# Patient Record
Sex: FEMALE | Race: WHITE | NOT HISPANIC OR LATINO | URBAN - METROPOLITAN AREA
[De-identification: names, ages, dates, MRNs, and addresses within clinical notes are randomized per-mention and may not be internally consistent; named-entity substitution may affect disease eponyms.]

---

## 2017-08-29 ENCOUNTER — OUTPATIENT (OUTPATIENT)
Dept: OUTPATIENT SERVICES | Facility: HOSPITAL | Age: 77
LOS: 1 days | Discharge: HOME | End: 2017-08-29

## 2017-08-29 DIAGNOSIS — I10 ESSENTIAL (PRIMARY) HYPERTENSION: ICD-10-CM

## 2018-01-26 ENCOUNTER — OUTPATIENT (OUTPATIENT)
Dept: OUTPATIENT SERVICES | Facility: HOSPITAL | Age: 78
LOS: 1 days | Discharge: HOME | End: 2018-01-26

## 2018-01-26 DIAGNOSIS — Z00.00 ENCOUNTER FOR GENERAL ADULT MEDICAL EXAMINATION WITHOUT ABNORMAL FINDINGS: ICD-10-CM

## 2018-01-26 DIAGNOSIS — I49.1 ATRIAL PREMATURE DEPOLARIZATION: ICD-10-CM

## 2018-01-26 DIAGNOSIS — R63.4 ABNORMAL WEIGHT LOSS: ICD-10-CM

## 2018-02-16 PROBLEM — Z00.00 ENCOUNTER FOR PREVENTIVE HEALTH EXAMINATION: Status: ACTIVE | Noted: 2018-02-16

## 2018-02-23 ENCOUNTER — APPOINTMENT (OUTPATIENT)
Dept: VASCULAR SURGERY | Facility: CLINIC | Age: 78
End: 2018-02-23
Payer: MEDICARE

## 2018-02-23 VITALS
BODY MASS INDEX: 25.76 KG/M2 | HEIGHT: 62 IN | DIASTOLIC BLOOD PRESSURE: 70 MMHG | SYSTOLIC BLOOD PRESSURE: 120 MMHG | WEIGHT: 140 LBS

## 2018-02-23 DIAGNOSIS — E78.00 PURE HYPERCHOLESTEROLEMIA, UNSPECIFIED: ICD-10-CM

## 2018-02-23 DIAGNOSIS — I73.9 PERIPHERAL VASCULAR DISEASE, UNSPECIFIED: ICD-10-CM

## 2018-02-23 DIAGNOSIS — K21.9 GASTRO-ESOPHAGEAL REFLUX DISEASE W/OUT ESOPHAGITIS: ICD-10-CM

## 2018-02-23 PROCEDURE — 99203 OFFICE O/P NEW LOW 30 MIN: CPT

## 2018-02-23 PROCEDURE — 93925 LOWER EXTREMITY STUDY: CPT

## 2018-05-01 ENCOUNTER — NON-APPOINTMENT (OUTPATIENT)
Age: 78
End: 2018-05-01

## 2018-05-01 ENCOUNTER — APPOINTMENT (OUTPATIENT)
Dept: CARDIOLOGY | Facility: CLINIC | Age: 78
End: 2018-05-01

## 2018-05-01 VITALS
BODY MASS INDEX: 26.13 KG/M2 | SYSTOLIC BLOOD PRESSURE: 128 MMHG | WEIGHT: 142 LBS | DIASTOLIC BLOOD PRESSURE: 76 MMHG | HEIGHT: 62 IN | HEART RATE: 121 BPM

## 2018-05-01 DIAGNOSIS — Z78.9 OTHER SPECIFIED HEALTH STATUS: ICD-10-CM

## 2018-05-01 DIAGNOSIS — Z87.891 PERSONAL HISTORY OF NICOTINE DEPENDENCE: ICD-10-CM

## 2018-05-01 DIAGNOSIS — I27.29 OTHER SECONDARY PULMONARY HYPERTENSION: ICD-10-CM

## 2018-05-01 RX ORDER — ATORVASTATIN CALCIUM 40 MG/1
40 TABLET, FILM COATED ORAL
Qty: 90 | Refills: 3 | Status: ACTIVE | COMMUNITY

## 2018-05-01 RX ORDER — AMLODIPINE BESYLATE 5 MG/1
5 TABLET ORAL DAILY
Qty: 30 | Refills: 3 | Status: DISCONTINUED | COMMUNITY
End: 2018-05-01

## 2018-05-01 RX ORDER — OMEGA-3/DHA/EPA/FISH OIL 300-1000MG
1000 CAPSULE ORAL DAILY
Refills: 0 | Status: ACTIVE | COMMUNITY

## 2018-05-01 RX ORDER — ASPIRIN 81 MG
81 TABLET, DELAYED RELEASE (ENTERIC COATED) ORAL DAILY
Refills: 0 | Status: DISCONTINUED | COMMUNITY
End: 2018-05-01

## 2018-05-01 RX ORDER — OMEPRAZOLE 20 MG/1
20 CAPSULE, DELAYED RELEASE ORAL TWICE DAILY
Refills: 0 | Status: ACTIVE | COMMUNITY

## 2018-05-04 RX ORDER — APIXABAN 5 MG/1
5 TABLET, FILM COATED ORAL
Qty: 120 | Refills: 5 | Status: DISCONTINUED | COMMUNITY
Start: 2018-05-01 | End: 2018-05-04

## 2018-05-15 ENCOUNTER — INPATIENT (INPATIENT)
Facility: HOSPITAL | Age: 78
LOS: 2 days | Discharge: HOME | End: 2018-05-18
Attending: INTERNAL MEDICINE | Admitting: INTERNAL MEDICINE

## 2018-05-15 VITALS
TEMPERATURE: 96 F | DIASTOLIC BLOOD PRESSURE: 99 MMHG | SYSTOLIC BLOOD PRESSURE: 141 MMHG | RESPIRATION RATE: 20 BRPM | OXYGEN SATURATION: 95 % | HEART RATE: 85 BPM

## 2018-05-15 LAB
ALBUMIN SERPL ELPH-MCNC: 4.1 G/DL — SIGNIFICANT CHANGE UP (ref 3.5–5.2)
ALP SERPL-CCNC: 93 U/L — SIGNIFICANT CHANGE UP (ref 30–115)
ALT FLD-CCNC: 22 U/L — SIGNIFICANT CHANGE UP (ref 0–41)
ANION GAP SERPL CALC-SCNC: 17 MMOL/L — HIGH (ref 7–14)
APTT BLD: 26.5 SEC — LOW (ref 27–39.2)
AST SERPL-CCNC: 26 U/L — SIGNIFICANT CHANGE UP (ref 0–41)
BASOPHILS # BLD AUTO: 0.08 K/UL — SIGNIFICANT CHANGE UP (ref 0–0.2)
BASOPHILS NFR BLD AUTO: 0.8 % — SIGNIFICANT CHANGE UP (ref 0–1)
BILIRUB SERPL-MCNC: 0.8 MG/DL — SIGNIFICANT CHANGE UP (ref 0.2–1.2)
BUN SERPL-MCNC: 32 MG/DL — HIGH (ref 10–20)
CALCIUM SERPL-MCNC: 9.2 MG/DL — SIGNIFICANT CHANGE UP (ref 8.5–10.1)
CHLORIDE SERPL-SCNC: 100 MMOL/L — SIGNIFICANT CHANGE UP (ref 98–110)
CO2 SERPL-SCNC: 19 MMOL/L — SIGNIFICANT CHANGE UP (ref 17–32)
CREAT SERPL-MCNC: 1.2 MG/DL — SIGNIFICANT CHANGE UP (ref 0.7–1.5)
EOSINOPHIL # BLD AUTO: 0.31 K/UL — SIGNIFICANT CHANGE UP (ref 0–0.7)
EOSINOPHIL NFR BLD AUTO: 3.1 % — SIGNIFICANT CHANGE UP (ref 0–8)
ETHANOL SERPL-MCNC: <10 MG/DL — HIGH
GLUCOSE SERPL-MCNC: 122 MG/DL — HIGH (ref 70–99)
HCT VFR BLD CALC: 34.9 % — LOW (ref 37–47)
HGB BLD-MCNC: 11.2 G/DL — LOW (ref 12–16)
IMM GRANULOCYTES NFR BLD AUTO: 0.5 % — HIGH (ref 0.1–0.3)
INR BLD: 2.61 RATIO — HIGH (ref 0.65–1.3)
LACTATE SERPL-SCNC: 1.1 MMOL/L — SIGNIFICANT CHANGE UP (ref 0.5–2.2)
LIDOCAIN IGE QN: 80 U/L — HIGH (ref 7–60)
LYMPHOCYTES # BLD AUTO: 2.68 K/UL — SIGNIFICANT CHANGE UP (ref 1.2–3.4)
LYMPHOCYTES # BLD AUTO: 26.7 % — SIGNIFICANT CHANGE UP (ref 20.5–51.1)
MCHC RBC-ENTMCNC: 32.1 G/DL — SIGNIFICANT CHANGE UP (ref 32–37)
MCHC RBC-ENTMCNC: 33.2 PG — HIGH (ref 27–31)
MCV RBC AUTO: 103.6 FL — HIGH (ref 81–99)
MONOCYTES # BLD AUTO: 0.82 K/UL — HIGH (ref 0.1–0.6)
MONOCYTES NFR BLD AUTO: 8.2 % — SIGNIFICANT CHANGE UP (ref 1.7–9.3)
NEUTROPHILS # BLD AUTO: 6.1 K/UL — SIGNIFICANT CHANGE UP (ref 1.4–6.5)
NEUTROPHILS NFR BLD AUTO: 60.7 % — SIGNIFICANT CHANGE UP (ref 42.2–75.2)
PLATELET # BLD AUTO: 203 K/UL — SIGNIFICANT CHANGE UP (ref 130–400)
POTASSIUM SERPL-MCNC: 4.6 MMOL/L — SIGNIFICANT CHANGE UP (ref 3.5–5)
POTASSIUM SERPL-SCNC: 4.6 MMOL/L — SIGNIFICANT CHANGE UP (ref 3.5–5)
PROT SERPL-MCNC: 6.3 G/DL — SIGNIFICANT CHANGE UP (ref 6–8)
PROTHROM AB SERPL-ACNC: 28.7 SEC — HIGH (ref 9.95–12.87)
RBC # BLD: 3.37 M/UL — LOW (ref 4.2–5.4)
RBC # FLD: 13.8 % — SIGNIFICANT CHANGE UP (ref 11.5–14.5)
SODIUM SERPL-SCNC: 136 MMOL/L — SIGNIFICANT CHANGE UP (ref 135–146)
WBC # BLD: 10.04 K/UL — SIGNIFICANT CHANGE UP (ref 4.8–10.8)
WBC # FLD AUTO: 10.04 K/UL — SIGNIFICANT CHANGE UP (ref 4.8–10.8)

## 2018-05-15 RX ORDER — METOCLOPRAMIDE HCL 10 MG
10 TABLET ORAL ONCE
Qty: 0 | Refills: 0 | Status: COMPLETED | OUTPATIENT
Start: 2018-05-15 | End: 2018-05-15

## 2018-05-15 RX ORDER — SODIUM CHLORIDE 9 MG/ML
500 INJECTION INTRAMUSCULAR; INTRAVENOUS; SUBCUTANEOUS ONCE
Qty: 0 | Refills: 0 | Status: COMPLETED | OUTPATIENT
Start: 2018-05-15 | End: 2018-05-15

## 2018-05-15 RX ORDER — ONDANSETRON 8 MG/1
4 TABLET, FILM COATED ORAL ONCE
Qty: 0 | Refills: 0 | Status: COMPLETED | OUTPATIENT
Start: 2018-05-15 | End: 2018-05-15

## 2018-05-15 RX ORDER — ACETAMINOPHEN 500 MG
650 TABLET ORAL ONCE
Qty: 0 | Refills: 0 | Status: COMPLETED | OUTPATIENT
Start: 2018-05-15 | End: 2018-05-15

## 2018-05-15 RX ADMIN — ONDANSETRON 4 MILLIGRAM(S): 8 TABLET, FILM COATED ORAL at 18:07

## 2018-05-15 RX ADMIN — Medication 650 MILLIGRAM(S): at 17:48

## 2018-05-15 RX ADMIN — Medication 650 MILLIGRAM(S): at 17:30

## 2018-05-15 RX ADMIN — Medication 104 MILLIGRAM(S): at 17:25

## 2018-05-15 RX ADMIN — ONDANSETRON 4 MILLIGRAM(S): 8 TABLET, FILM COATED ORAL at 15:04

## 2018-05-15 RX ADMIN — SODIUM CHLORIDE 1000 MILLILITER(S): 9 INJECTION INTRAMUSCULAR; INTRAVENOUS; SUBCUTANEOUS at 17:16

## 2018-05-15 NOTE — ED PROVIDER NOTE - PHYSICAL EXAMINATION
CONSTITUTIONAL: Well-developed; well-nourished;   SKIN: warm, dry  HEAD: Normocephalic; 1 cm laceration to occiput with contusion. No foreign body.   EYES: no conj injection  ENT: No nasal discharge; airway clear.  NECK: Supple; non tender.  CARD: S1, S2 normal; no murmurs, gallops, or rubs. Regular rate and rhythm.   RESP: No wheezes, rales or rhonchi.  ABD: soft ntnd  EXT: Normal ROM.    NEURO: Alert, oriented, grossly unremarkable. GCS 15  PSYCH: Cooperative, appropriate.

## 2018-05-15 NOTE — ED ADULT TRIAGE NOTE - CHIEF COMPLAINT QUOTE
Pt BIBA from home, missed one step, fell down 3 steps, hit head on concrete floor, no LOC, on Aspirin and Xarelto, pt aox3, GCS 15. Pt has laceration to head. Trauma alert activated in triage.

## 2018-05-15 NOTE — H&P ADULT - ASSESSMENT
77 year female s/p fall down 1 step, +HT, -LOC, +eliquis  1. No traumatic injury  2. Staples applied to laceration - f/u in 1 week for removal  3. disposition per ED

## 2018-05-15 NOTE — ED PROVIDER NOTE - MEDICAL DECISION MAKING DETAILS
I personally evaluated the patient. I reviewed the Resident’s or Physician Assistant’s note (as assigned above), and agree with the findings and plan except as documented in my note. Patient unabel to ambulate due to weakness, patient admitted for rehab eval

## 2018-05-15 NOTE — ED PROVIDER NOTE - OBJECTIVE STATEMENT
76 yo F presents to ED after mechanical fall from standing. Hit head, no loc, + nausea and dizziness no vomiting. + r shoulder pain. Denies any other injuries. On xarelto 76 yo F presents to ED after mechanical fall from standing. Hit head, no loc, + nausea and dizziness no vomiting. + r shoulder pain. Denies any other injuries. On xarelto.

## 2018-05-15 NOTE — H&P ADULT - HISTORY OF PRESENT ILLNESS
77 year female, on eliquis for afib, s/p trip & fall down 1 step, +HT, -LOC, c/o nausea and dizziness

## 2018-05-15 NOTE — ED PROVIDER NOTE - NS ED ROS FT
Eyes:  No visual changes  ENMT:  No neck pain or stiffness.  Cardiac:  No chest pain, SOB   Respiratory:  No cough   GI:  No nausea, vomiting, diarrhea or abdominal pain.  :  No dysuria  MS:  No back pain.  Neuro:  No LOC, +nausea nad dizziness.  Skin:  No skin rash.

## 2018-05-15 NOTE — H&P ADULT - NSHPLABSRESULTS_GEN_ALL_CORE
Labs:  CAPILLARY BLOOD GLUCOSE                              11.2   10.04 )-----------( 203      ( 15 May 2018 16:17 )             34.9       05-15    136  |  100  |  32<H>  ----------------------------<  122<H>  4.6   |  19  |  1.2    Ca    9.2      15 May 2018 16:17    TPro  6.3  /  Alb  4.1  /  TBili  0.8  /  DBili  x   /  AST  26  /  ALT  22  /  AlkPhos  93  05-15      PT/INR - ( 15 May 2018 16:17 )   PT: 28.70 sec;   INR: 2.61 ratio         PTT - ( 15 May 2018 16:17 )  PTT:26.5 sec    < from: CT Head No Cont (05.15.18 @ 16:34) >    IMPRESSION:     1.  Right posterior parietal extra calvarial soft tissue swelling with   overlying skin staples and no acute underlying fractures or dislocations.    2.  Periventricular and subcortical white matter chronic small vessel   ischemic changes.    3.  No acute mass effect, midline shift or hemorrhage.      < end of copied text >    < from: CT Cervical Spine No Cont (05.15.18 @ 16:33) >    IMPRESSION:    1.  No evidence of acute cervical spine fracture or subluxation.    2.  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    3.  Advanced degenerative changes with severe spinal stenosis C4-5 and   C5-6 abd multilevel moderate-severe foraminal narrowing.    < end of copied text >

## 2018-05-15 NOTE — ED ADULT NURSE NOTE - OBJECTIVE STATEMENT
Patient BIBA s/p fall down three steps. Patient hit right side of head but denies LOC. Alert and oriented at this time. Right scalp hemorrhage noted. Cervical collar in place. Patient BIBA s/p fall down three steps. Patient hit right side of head but denies LOC. Alert and oriented at this time. Right scalp laceration noted. Cervical collar in place.

## 2018-05-15 NOTE — ED PROVIDER NOTE - PROGRESS NOTE DETAILS
I personally evaluated the patient. I reviewed the Resident’s or Physician Assistant’s note (as assigned above), and agree with the findings and plan except as documented in my note.   78 Y/O F HTN, DYSLIPIDEMIA, AFIB (ON XARELTO) S/P FALL DOWN 3 STAIRS. + HEAD TRAUMA. NO LOC. PT DENIES ANY SXS PRECEDING FALL. PT C/O NAUSEA AND DIZZINESS. NO NECK OR BACK PAIN. NO CP, SOB. NO ABD PAIN. EMS WAS CALLED BY A NEIGHBOR. VITALS NOTED. ALERT OX3 NAD [id number]. 1 CM LACERATION TO OCCIPUT .+ HEMATOMA. NO MIDLINE C SPINE TENDERNESS. LUNGS CLEAR B/L. CHEST NONTENDER, NO CREPITUS. RRR. ABD- SOFT NONTENDER. PELVIS STABLE NONTENDER. BACK NONTENDER. NO SPINE TENDERNESS. NEURO EXAM NONFOCAL. PT SIGNED OUT TO DR. KOTHARI, REASSESS AND DISPO.

## 2018-05-16 LAB
ANION GAP SERPL CALC-SCNC: 12 MMOL/L — SIGNIFICANT CHANGE UP (ref 7–14)
BASOPHILS # BLD AUTO: 0.06 K/UL — SIGNIFICANT CHANGE UP (ref 0–0.2)
BASOPHILS NFR BLD AUTO: 0.7 % — SIGNIFICANT CHANGE UP (ref 0–1)
BUN SERPL-MCNC: 23 MG/DL — HIGH (ref 10–20)
CALCIUM SERPL-MCNC: 8.7 MG/DL — SIGNIFICANT CHANGE UP (ref 8.5–10.1)
CHLORIDE SERPL-SCNC: 101 MMOL/L — SIGNIFICANT CHANGE UP (ref 98–110)
CO2 SERPL-SCNC: 25 MMOL/L — SIGNIFICANT CHANGE UP (ref 17–32)
CREAT SERPL-MCNC: 1.3 MG/DL — SIGNIFICANT CHANGE UP (ref 0.7–1.5)
EOSINOPHIL # BLD AUTO: 0.16 K/UL — SIGNIFICANT CHANGE UP (ref 0–0.7)
EOSINOPHIL NFR BLD AUTO: 1.8 % — SIGNIFICANT CHANGE UP (ref 0–8)
GLUCOSE SERPL-MCNC: 102 MG/DL — HIGH (ref 70–99)
HCT VFR BLD CALC: 28.5 % — LOW (ref 37–47)
HCT VFR BLD CALC: 29 % — LOW (ref 37–47)
HGB BLD-MCNC: 9.3 G/DL — LOW (ref 12–16)
HGB BLD-MCNC: 9.4 G/DL — LOW (ref 12–16)
IMM GRANULOCYTES NFR BLD AUTO: 0.3 % — SIGNIFICANT CHANGE UP (ref 0.1–0.3)
LYMPHOCYTES # BLD AUTO: 2.16 K/UL — SIGNIFICANT CHANGE UP (ref 1.2–3.4)
LYMPHOCYTES # BLD AUTO: 24.6 % — SIGNIFICANT CHANGE UP (ref 20.5–51.1)
MCHC RBC-ENTMCNC: 32.4 G/DL — SIGNIFICANT CHANGE UP (ref 32–37)
MCHC RBC-ENTMCNC: 32.6 G/DL — SIGNIFICANT CHANGE UP (ref 32–37)
MCHC RBC-ENTMCNC: 33.3 PG — HIGH (ref 27–31)
MCHC RBC-ENTMCNC: 33.5 PG — HIGH (ref 27–31)
MCV RBC AUTO: 102.5 FL — HIGH (ref 81–99)
MCV RBC AUTO: 102.8 FL — HIGH (ref 81–99)
MONOCYTES # BLD AUTO: 0.87 K/UL — HIGH (ref 0.1–0.6)
MONOCYTES NFR BLD AUTO: 9.9 % — HIGH (ref 1.7–9.3)
NEUTROPHILS # BLD AUTO: 5.49 K/UL — SIGNIFICANT CHANGE UP (ref 1.4–6.5)
NEUTROPHILS NFR BLD AUTO: 62.7 % — SIGNIFICANT CHANGE UP (ref 42.2–75.2)
NRBC # BLD: 0 /100 WBCS — SIGNIFICANT CHANGE UP (ref 0–0)
PLATELET # BLD AUTO: 168 K/UL — SIGNIFICANT CHANGE UP (ref 130–400)
PLATELET # BLD AUTO: 168 K/UL — SIGNIFICANT CHANGE UP (ref 130–400)
POTASSIUM SERPL-MCNC: 4.8 MMOL/L — SIGNIFICANT CHANGE UP (ref 3.5–5)
POTASSIUM SERPL-SCNC: 4.8 MMOL/L — SIGNIFICANT CHANGE UP (ref 3.5–5)
RBC # BLD: 2.78 M/UL — LOW (ref 4.2–5.4)
RBC # BLD: 2.82 M/UL — LOW (ref 4.2–5.4)
RBC # FLD: 13.6 % — SIGNIFICANT CHANGE UP (ref 11.5–14.5)
RBC # FLD: 13.7 % — SIGNIFICANT CHANGE UP (ref 11.5–14.5)
SODIUM SERPL-SCNC: 138 MMOL/L — SIGNIFICANT CHANGE UP (ref 135–146)
WBC # BLD: 8.62 K/UL — SIGNIFICANT CHANGE UP (ref 4.8–10.8)
WBC # BLD: 8.77 K/UL — SIGNIFICANT CHANGE UP (ref 4.8–10.8)
WBC # FLD AUTO: 8.62 K/UL — SIGNIFICANT CHANGE UP (ref 4.8–10.8)
WBC # FLD AUTO: 8.77 K/UL — SIGNIFICANT CHANGE UP (ref 4.8–10.8)

## 2018-05-16 RX ORDER — METOPROLOL TARTRATE 50 MG
100 TABLET ORAL DAILY
Qty: 0 | Refills: 0 | Status: DISCONTINUED | OUTPATIENT
Start: 2018-05-16 | End: 2018-05-16

## 2018-05-16 RX ORDER — LOSARTAN POTASSIUM 100 MG/1
50 TABLET, FILM COATED ORAL DAILY
Qty: 0 | Refills: 0 | Status: DISCONTINUED | OUTPATIENT
Start: 2018-05-16 | End: 2018-05-18

## 2018-05-16 RX ORDER — METOPROLOL TARTRATE 50 MG
50 TABLET ORAL DAILY
Qty: 0 | Refills: 0 | Status: DISCONTINUED | OUTPATIENT
Start: 2018-05-16 | End: 2018-05-17

## 2018-05-16 RX ORDER — AMLODIPINE BESYLATE 2.5 MG/1
5 TABLET ORAL DAILY
Qty: 0 | Refills: 0 | Status: DISCONTINUED | OUTPATIENT
Start: 2018-05-16 | End: 2018-05-17

## 2018-05-16 RX ORDER — ASPIRIN/CALCIUM CARB/MAGNESIUM 324 MG
81 TABLET ORAL DAILY
Qty: 0 | Refills: 0 | Status: DISCONTINUED | OUTPATIENT
Start: 2018-05-16 | End: 2018-05-17

## 2018-05-16 RX ORDER — ATORVASTATIN CALCIUM 80 MG/1
40 TABLET, FILM COATED ORAL AT BEDTIME
Qty: 0 | Refills: 0 | Status: DISCONTINUED | OUTPATIENT
Start: 2018-05-16 | End: 2018-05-18

## 2018-05-16 RX ORDER — METOPROLOL TARTRATE 50 MG
100 TABLET ORAL ONCE
Qty: 0 | Refills: 0 | Status: COMPLETED | OUTPATIENT
Start: 2018-05-16 | End: 2018-05-16

## 2018-05-16 RX ORDER — ACETAMINOPHEN 500 MG
650 TABLET ORAL EVERY 6 HOURS
Qty: 0 | Refills: 0 | Status: DISCONTINUED | OUTPATIENT
Start: 2018-05-16 | End: 2018-05-18

## 2018-05-16 RX ADMIN — Medication 650 MILLIGRAM(S): at 03:26

## 2018-05-16 RX ADMIN — Medication 650 MILLIGRAM(S): at 21:04

## 2018-05-16 RX ADMIN — Medication 650 MILLIGRAM(S): at 20:05

## 2018-05-16 RX ADMIN — Medication 50 MILLIGRAM(S): at 06:50

## 2018-05-16 RX ADMIN — Medication 650 MILLIGRAM(S): at 11:03

## 2018-05-16 RX ADMIN — Medication 100 MILLIGRAM(S): at 01:58

## 2018-05-16 RX ADMIN — Medication 650 MILLIGRAM(S): at 11:30

## 2018-05-16 RX ADMIN — Medication 81 MILLIGRAM(S): at 11:00

## 2018-05-16 RX ADMIN — ATORVASTATIN CALCIUM 40 MILLIGRAM(S): 80 TABLET, FILM COATED ORAL at 22:04

## 2018-05-16 NOTE — PROGRESS NOTE ADULT - SUBJECTIVE AND OBJECTIVE BOX
CC: FALL  HPI:  77 year female, on Xarelto for afib, s/p trip & fall down 1 step, +HT, -LOC, c/o nausea and dizziness (15 May 2018 18:39)    PAST MEDICAL & SURGICAL HISTORY:  High cholesterol  Afib  HTN (hypertension)  HLD    Allergies    Allergy none    Home Medications:  amLODIPine 5 mg oral tablet: 1 tab(s) orally once a day (15 May 2018 16:02)  aspirin 81 mg oral tablet, chewable: 1 tab(s) orally once a day (15 May 2018 16:02)  atorvastatin 40 mg oral tablet: 1 tab(s) orally once a day (15 May 2018 16:02)  losartan 50 mg oral tablet: 1 tab(s) orally once a day (15 May 2018 16:02)  metoprolol succinate 100 mg oral tablet, extended release: 1 tab(s) orally twice a day (15 May 2018 16:02)  Xarelto 20 mg qHS    Social history:   Marital Status:  (   )    (  x ) Single    (   )    (  )   Occupation:   Lives with: (  ) alone  (  ) children   (  ) spouse   (  ) parents  ( x ) other: sisters  Substance Use (street drugs): ( x ) never used  (  ) other:  Tobacco Usage:  ( x  ) never smoked   (   ) former smoker   (   ) current smoker  (     ) pack year    REVIEW OF SYSTEMS:    CONSTITUTIONAL: No weakness, fevers or chills.  HEENT: No visual changes, no hearing loss, no throat pain, no headache.  NECK: No pain or stiffness, no lymphadenopathy.  RESPIRATORY: No cough, wheezing, hemoptysis. No shortness of breath.  CARDIOVASCULAR: No chest pain, no palpitations, no orthopnea, no PND.  GASTROINTESTINAL: No abdominal pain. No nausea, vomiting, or hematemesis. No diarrhea or constipation, no bloating.  No melena or hematochezia.  GENITOURINARY: No dysuria, frequency or hematuria.  NEUROLOGICAL: Lumbar disc herniation  MUSCULOSKELETAL: gait abnormalities on PT.   SKIN: No itching, no rashes.    PHYSICAL EXAM:    CONSTITUTIONAL: NAD, well-developed, well-nourished  HEAD:  right scalp laceration. No bleeding.   EYES: EOMI, PERRLA, clear conjunctivae, anicteric sclerae, no nystagmus  NECK: Supple, no JVD, no carotid bruit   CHEST/LUNG: Clear to auscultation bilaterally, no wheezing or rhonchi  HEART: S1 S2 normal, irregularly irregular. No murmurs, rubs, or gallops  ABDOMEN: Bowel sounds present. Soft, non-tender, non-distended. No rebound or guarding  EXTREMITIES:  2+ peripheral pulses, no clubbing, cyanosis, or edema  PSYCH: Normal affect. Cooperative.  NEUROLOGY: AAOx3. No focal deficits. CN II-XII grossly intact  MSK: No joint swelling, redness

## 2018-05-16 NOTE — PHYSICAL THERAPY INITIAL EVALUATION ADULT - GENERAL OBSERVATIONS, REHAB EVAL
1040 - 110 Pt rec semifowler in bed in NAD. Pt is high functioning with mobility and does not require skilled b/s PT intervention at this time.

## 2018-05-16 NOTE — PROGRESS NOTE ADULT - ATTENDING COMMENTS
Patient is a 77y old  Female who presents with a chief complaint of s/p fall down 1 step (15 May 2018 18:39)  She still feels weak and dizzy on ambulation.    1. Head trauma      s/p trauma work-up. Cleared by trauma      PT/rehab evals     Monitor CBC    2.  A-fib:     Metoprolol 100 mg q12h     Resume Xarelto post Neuro SX eval

## 2018-05-16 NOTE — PROGRESS NOTE ADULT - SUBJECTIVE AND OBJECTIVE BOX
CARLA MARES  77y  Female    Patient is a 77y old  Female who presents with a chief complaint of s/p fall down 1 step (15 May 2018 18:39)      INTERVAL HPI/OVERNIGHT EVENTS: None    T(C): 36.9 (05-15-18 @ 20:08), Max: 37 (05-15-18 @ 18:32)  HR: 101 (05-15-18 @ 20:08) (85 - 132)  BP: 119/71 (05-15-18 @ 20:08) (116/64 - 141/99)  RR: 18 (05-15-18 @ 20:08) (18 - 20)  SpO2: 99% (05-15-18 @ 20:08) (95% - 99%)  Wt(kg): --Vital Signs Last 24 Hrs  T(C): 36.9 (15 May 2018 20:08), Max: 37 (15 May 2018 18:32)  T(F): 98.4 (15 May 2018 20:08), Max: 98.6 (15 May 2018 18:32)  HR: 101 (15 May 2018 20:08) (85 - 132)  BP: 119/71 (15 May 2018 20:08) (116/64 - 141/99)  BP(mean): --  RR: 18 (15 May 2018 20:08) (18 - 20)  SpO2: 99% (15 May 2018 20:08) (95% - 99%)    PHYSICAL EXAM:  GENERAL: NAD, well-groomed, well-developed  HEAD:  Atraumatic, Normocephalic  NECK: Supple, No JVD, Normal thyroid  NERVOUS SYSTEM:  Alert & Oriented X3, Good concentration; Motor Strength 5/5 B/L upper and lower extremities; DTRs 2+ intact and symmetric  CHEST/LUNG: Clear to percussion bilaterally; No rales, rhonchi, wheezing, or rubs  HEART: Regular rate and rhythm; No murmurs, rubs, or gallops  ABDOMEN: Soft, Nontender, Nondistended; Bowel sounds present  EXTREMITIES:  2+ Peripheral Pulses, No clubbing, cyanosis, or edema    Consultant(s) Notes Reviewed:  [x ] YES  [ ] NO    Discussed with Consultants/Other Providers/Residents [ x] YES     LABS                         11.2   10.04 )-----------( 203      ( 15 May 2018 16:17 )             34.9     05-15    136  |  100  |  32<H>  ----------------------------<  122<H>  4.6   |  19  |  1.2    Ca    9.2      15 May 2018 16:17    TPro  6.3  /  Alb  4.1  /  TBili  0.8  /  DBili  x   /  AST  26  /  ALT  22  /  AlkPhos  93  05-15    PT/INR - ( 15 May 2018 16:17 )   PT: 28.70 sec;   INR: 2.61 ratio         PTT - ( 15 May 2018 16:17 )  PTT:26.5 sec      LIVER FUNCTIONS - ( 15 May 2018 16:17 )  Alb: 4.1 g/dL / Pro: 6.3 g/dL / ALK PHOS: 93 U/L / ALT: 22 U/L / AST: 26 U/L / GGT: x           CAPILLARY BLOOD GLUCOSE            RADIOLOGY & ADDITIONAL TESTS:    Imaging Personally Reviewed:  [x ] YES  [ ] NO    MEDICATIONS  (STANDING):  amLODIPine   Tablet 5 milliGRAM(s) Oral daily  aspirin  chewable 81 milliGRAM(s) Oral daily  atorvastatin 40 milliGRAM(s) Oral at bedtime  losartan 50 milliGRAM(s) Oral daily  metoprolol succinate  milliGRAM(s) Oral daily    MEDICATIONS  (PRN):  acetaminophen   Tablet. 650 milliGRAM(s) Oral every 6 hours PRN Moderate Pain (4 - 6)      HEALTH ISSUES - PROBLEM Dx:

## 2018-05-16 NOTE — PROGRESS NOTE ADULT - ASSESSMENT
Patient is a 77y old  Female who presents with a chief complaint of s/p fall down 1 step (15 May 2018 18:39)  She still feels weak and dizzy on ambulation.    # Head trauma:   -s/p trauma work-up. Cleared by trauma  -admit for PT/rehab   -Monitor CBC    # A-fib:   - Metoprolol 100 mg q12h  - Resume Xarelto tomorrow if stable

## 2018-05-16 NOTE — PHYSICAL THERAPY INITIAL EVALUATION ADULT - THERAPY FREQUENCY, PT EVAL
Pt d/c from b/s PT. Pt can cont amb with NSG as needed, does not require skilled b/s PT intervention.

## 2018-05-16 NOTE — PHYSICAL THERAPY INITIAL EVALUATION ADULT - LEVEL OF INDEPENDENCE: GAIT, REHAB EVAL
supervision/Pt with brief complaints of dizziness during amb however short lasting (3-5 seconds) with no c/o "spinning".

## 2018-05-16 NOTE — PHYSICAL THERAPY INITIAL EVALUATION ADULT - LEVEL OF INDEPENDENCE: STAIR NEGOTIATION, REHAB EVAL
Did no assess due to substantial distance from ER however pt reports she has no concerns regarding stair negotiation at this time.

## 2018-05-17 ENCOUNTER — TRANSCRIPTION ENCOUNTER (OUTPATIENT)
Age: 78
End: 2018-05-17

## 2018-05-17 LAB
ANION GAP SERPL CALC-SCNC: 11 MMOL/L — SIGNIFICANT CHANGE UP (ref 7–14)
BUN SERPL-MCNC: 20 MG/DL — SIGNIFICANT CHANGE UP (ref 10–20)
CALCIUM SERPL-MCNC: 9 MG/DL — SIGNIFICANT CHANGE UP (ref 8.5–10.1)
CHLORIDE SERPL-SCNC: 102 MMOL/L — SIGNIFICANT CHANGE UP (ref 98–110)
CO2 SERPL-SCNC: 24 MMOL/L — SIGNIFICANT CHANGE UP (ref 17–32)
CREAT SERPL-MCNC: 1.1 MG/DL — SIGNIFICANT CHANGE UP (ref 0.7–1.5)
GLUCOSE SERPL-MCNC: 87 MG/DL — SIGNIFICANT CHANGE UP (ref 70–99)
HCT VFR BLD CALC: 29.9 % — LOW (ref 37–47)
HGB BLD-MCNC: 9.5 G/DL — LOW (ref 12–16)
MCHC RBC-ENTMCNC: 31.8 G/DL — LOW (ref 32–37)
MCHC RBC-ENTMCNC: 33 PG — HIGH (ref 27–31)
MCV RBC AUTO: 103.8 FL — HIGH (ref 81–99)
NRBC # BLD: 0 /100 WBCS — SIGNIFICANT CHANGE UP (ref 0–0)
PLATELET # BLD AUTO: 163 K/UL — SIGNIFICANT CHANGE UP (ref 130–400)
POTASSIUM SERPL-MCNC: 4.4 MMOL/L — SIGNIFICANT CHANGE UP (ref 3.5–5)
POTASSIUM SERPL-SCNC: 4.4 MMOL/L — SIGNIFICANT CHANGE UP (ref 3.5–5)
RBC # BLD: 2.88 M/UL — LOW (ref 4.2–5.4)
RBC # FLD: 14 % — SIGNIFICANT CHANGE UP (ref 11.5–14.5)
SODIUM SERPL-SCNC: 137 MMOL/L — SIGNIFICANT CHANGE UP (ref 135–146)
WBC # BLD: 7.97 K/UL — SIGNIFICANT CHANGE UP (ref 4.8–10.8)
WBC # FLD AUTO: 7.97 K/UL — SIGNIFICANT CHANGE UP (ref 4.8–10.8)

## 2018-05-17 RX ORDER — METOPROLOL TARTRATE 50 MG
2.5 TABLET ORAL ONCE
Qty: 0 | Refills: 0 | Status: COMPLETED | OUTPATIENT
Start: 2018-05-17 | End: 2018-05-17

## 2018-05-17 RX ORDER — ONDANSETRON 8 MG/1
4 TABLET, FILM COATED ORAL EVERY 6 HOURS
Qty: 0 | Refills: 0 | Status: DISCONTINUED | OUTPATIENT
Start: 2018-05-17 | End: 2018-05-18

## 2018-05-17 RX ORDER — AMLODIPINE BESYLATE 2.5 MG/1
1 TABLET ORAL
Qty: 0 | Refills: 0 | COMMUNITY

## 2018-05-17 RX ORDER — PANTOPRAZOLE SODIUM 20 MG/1
40 TABLET, DELAYED RELEASE ORAL
Qty: 0 | Refills: 0 | Status: DISCONTINUED | OUTPATIENT
Start: 2018-05-17 | End: 2018-05-18

## 2018-05-17 RX ORDER — METOPROLOL TARTRATE 50 MG
100 TABLET ORAL
Qty: 0 | Refills: 0 | Status: DISCONTINUED | OUTPATIENT
Start: 2018-05-17 | End: 2018-05-18

## 2018-05-17 RX ADMIN — Medication 650 MILLIGRAM(S): at 08:45

## 2018-05-17 RX ADMIN — Medication 650 MILLIGRAM(S): at 17:46

## 2018-05-17 RX ADMIN — Medication 50 MILLIGRAM(S): at 05:58

## 2018-05-17 RX ADMIN — Medication 650 MILLIGRAM(S): at 23:09

## 2018-05-17 RX ADMIN — Medication 2.5 MILLIGRAM(S): at 14:45

## 2018-05-17 RX ADMIN — Medication 81 MILLIGRAM(S): at 11:28

## 2018-05-17 RX ADMIN — ATORVASTATIN CALCIUM 40 MILLIGRAM(S): 80 TABLET, FILM COATED ORAL at 22:53

## 2018-05-17 RX ADMIN — Medication 650 MILLIGRAM(S): at 07:44

## 2018-05-17 RX ADMIN — Medication 100 MILLIGRAM(S): at 17:33

## 2018-05-17 RX ADMIN — ONDANSETRON 4 MILLIGRAM(S): 8 TABLET, FILM COATED ORAL at 12:19

## 2018-05-17 RX ADMIN — PANTOPRAZOLE SODIUM 40 MILLIGRAM(S): 20 TABLET, DELAYED RELEASE ORAL at 12:19

## 2018-05-17 RX ADMIN — Medication 650 MILLIGRAM(S): at 18:34

## 2018-05-17 NOTE — CHART NOTE - NSCHARTNOTEFT_GEN_A_CORE
Pt was stable to be d/c home today , as pt was able to ambulate and NS cleared pt for AC. Pt was found to have a heart rate of >130 , ekg done showed afib with RVR. Pt was given stat dose of iv metoprolol 2.5, with no improvement in HR . On further investigation it was found that pt was recently recommended by Dr Reeder to take 100 mg BID of metoprolol instead of 50 bid.pt was not given 100 mg bid and was getting 50 mg bid while in hospital. Cardiology fellow was made aware of situation and recommended to give home dose of metoprolol stat , upgrade to tele and continue to monitor.    Dr reeder also held aspirin and norvasc, switched pt to eliquis .  will continue to monitor and make adjustments in medications.

## 2018-05-17 NOTE — PROGRESS NOTE ADULT - ASSESSMENT
Patient is a 77y old  Female who presents with a chief complaint of s/p fall down 1 step (15 May 2018 18:39)  She still feels weak and dizzy on ambulation.    # Head trauma:   -s/p trauma work-up. Cleared by trauma , f/u in 1  wk with surgery for staple removal  -pt was seen by physical therapy and was recommended to ambulate with nursing staff as tolerated.  - Dr Motta requested for physiatrist eval again , as pt reports feeling dizzy while ambulating.    # A-fib:   - Metoprolol 100 mg q12h  - Dr Motta wants pt to be evaluated by NS before starting back on xeralto.  - NS eval pending.    # DVT :  - PPX not indicated.   - will resume xeralto once cleared from NS.

## 2018-05-17 NOTE — DISCHARGE NOTE ADULT - MEDICATION SUMMARY - MEDICATIONS TO STOP TAKING
I will STOP taking the medications listed below when I get home from the hospital:    amLODIPine 5 mg oral tablet  -- 1 tab(s) by mouth once a day I will STOP taking the medications listed below when I get home from the hospital:    metoprolol succinate 100 mg oral tablet, extended release  -- 1 tab(s) by mouth once a day    amLODIPine 5 mg oral tablet  -- 1 tab(s) by mouth once a day    aspirin 81 mg oral tablet, chewable  -- 1 tab(s) by mouth once a day

## 2018-05-17 NOTE — CONSULT NOTE ADULT - SUBJECTIVE AND OBJECTIVE BOX
HISTORY OF PRESENT ILLNESS:     77 year female, on eliquis for afib, s/p trip & fall down 1 step, +HT, -LOC, c/o nausea and dizziness      PAST MEDICAL & SURGICAL HISTORY:  High cholesterol  Afib  HTN (hypertension)    Allergies    No Known Allergies            MEDICATIONS:  Antibiotics:    Neuro:  acetaminophen   Tablet. 650 milliGRAM(s) Oral every 6 hours PRN  ondansetron Injectable 4 milliGRAM(s) IV Push every 6 hours PRN    Anticoagulation:  aspirin  chewable 81 milliGRAM(s) Oral daily    OTHER:  amLODIPine   Tablet 5 milliGRAM(s) Oral daily  atorvastatin 40 milliGRAM(s) Oral at bedtime  losartan 50 milliGRAM(s) Oral daily  metoprolol succinate ER 50 milliGRAM(s) Oral daily  pantoprazole    Tablet 40 milliGRAM(s) Oral before breakfast    IVF:      Vital Signs Last 24 Hrs  T(C): 36.5 (16 May 2018 23:32), Max: 36.5 (16 May 2018 23:32)  T(F): 97.7 (16 May 2018 23:32), Max: 97.7 (16 May 2018 23:32)  HR: 69 (17 May 2018 05:22) (69 - 115)  BP: 112/55 (17 May 2018 05:22) (95/52 - 113/64)  BP(mean): --  RR: 18 (16 May 2018 23:32) (18 - 18)  SpO2: 96% (16 May 2018 23:32) (96% - 99%)    PHYSICAL EXAM:      LABS:                        9.5    7.97  )-----------( 163      ( 17 May 2018 06:09 )             29.9     05-17    137  |  102  |  20  ----------------------------<  87  4.4   |  24  |  1.1    Ca    9.0      17 May 2018 06:09    TPro  6.3  /  Alb  4.1  /  TBili  0.8  /  DBili  x   /  AST  26  /  ALT  22  /  AlkPhos  93  05-15    PT/INR - ( 15 May 2018 16:17 )   PT: 28.70 sec;   INR: 2.61 ratio         PTT - ( 15 May 2018 16:17 )  PTT:26.5 sec      RADIOLOGY & ADDITIONAL STUDIES:    < from: CT Head No Cont (05.15.18 @ 16:34) >  1.  Right posterior parietal extra calvarial soft tissue swelling with   overlying skin staples and no acute underlying fractures or dislocations.    2.  Periventricular and subcortical white matter chronic small vessel   ischemic changes.    3.  No acute mass effect, midline shift or hemorrhage.        < end of copied text >    < from: CT Cervical Spine No Cont (05.15.18 @ 16:33) >    1.  No evidence of acute cervical spine fracture or subluxation.    2.  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    3.  Advanced degenerative changes with severe spinal stenosis C4-5 and   C5-6 abd multilevel moderate-severe foraminal narrowing.    < end of copied text >      A/p             S/P fall                 May continue her anti coagulation HISTORY OF PRESENT ILLNESS:     77 year female, on eliquis for afib, s/p trip & fall down 1 step, +HT, -LOC, c/o nausea and dizziness      PAST MEDICAL & SURGICAL HISTORY:  High cholesterol  Afib  HTN (hypertension)    Allergies    No Known Allergies            MEDICATIONS:  Antibiotics:    Neuro:  acetaminophen   Tablet. 650 milliGRAM(s) Oral every 6 hours PRN  ondansetron Injectable 4 milliGRAM(s) IV Push every 6 hours PRN    Anticoagulation:  aspirin  chewable 81 milliGRAM(s) Oral daily    OTHER:  amLODIPine   Tablet 5 milliGRAM(s) Oral daily  atorvastatin 40 milliGRAM(s) Oral at bedtime  losartan 50 milliGRAM(s) Oral daily  metoprolol succinate ER 50 milliGRAM(s) Oral daily  pantoprazole    Tablet 40 milliGRAM(s) Oral before breakfast    IVF:      Vital Signs Last 24 Hrs  T(C): 36.5 (16 May 2018 23:32), Max: 36.5 (16 May 2018 23:32)  T(F): 97.7 (16 May 2018 23:32), Max: 97.7 (16 May 2018 23:32)  HR: 69 (17 May 2018 05:22) (69 - 115)  BP: 112/55 (17 May 2018 05:22) (95/52 - 113/64)  BP(mean): --  RR: 18 (16 May 2018 23:32) (18 - 18)  SpO2: 96% (16 May 2018 23:32) (96% - 99%)    PHYSICAL EXAM:  Alert, PERRL , EOM (I)   MAEX 4 , strength 5/5 bilaterally   No neck pain     LABS:                        9.5    7.97  )-----------( 163      ( 17 May 2018 06:09 )             29.9     05-17    137  |  102  |  20  ----------------------------<  87  4.4   |  24  |  1.1    Ca    9.0      17 May 2018 06:09    TPro  6.3  /  Alb  4.1  /  TBili  0.8  /  DBili  x   /  AST  26  /  ALT  22  /  AlkPhos  93  05-15    PT/INR - ( 15 May 2018 16:17 )   PT: 28.70 sec;   INR: 2.61 ratio         PTT - ( 15 May 2018 16:17 )  PTT:26.5 sec      RADIOLOGY & ADDITIONAL STUDIES:    < from: CT Head No Cont (05.15.18 @ 16:34) >  1.  Right posterior parietal extra calvarial soft tissue swelling with   overlying skin staples and no acute underlying fractures or dislocations.    2.  Periventricular and subcortical white matter chronic small vessel   ischemic changes.    3.  No acute mass effect, midline shift or hemorrhage.        < end of copied text >    < from: CT Cervical Spine No Cont (05.15.18 @ 16:33) >    1.  No evidence of acute cervical spine fracture or subluxation.    2.  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    3.  Advanced degenerative changes with severe spinal stenosis C4-5 and   C5-6 abd multilevel moderate-severe foraminal narrowing.    < end of copied text >      A/p             S/P fall                 May continue her anti coagulation                 Follow up with Dr Youngblood for cervical stenosis in her office                             [address] [place] [address]

## 2018-05-17 NOTE — PROGRESS NOTE ADULT - SUBJECTIVE AND OBJECTIVE BOX
CARLA MARES  77y  Female    Patient is a 77y old  Female who presents with a chief complaint of s/p fall down 1 step (15 May 2018 18:39)      INTERVAL HPI/OVERNIGHT EVENTS: None    T(C): 36.5 (05-16-18 @ 23:32), Max: 36.7 (05-16-18 @ 06:22)  HR: 72 (05-16-18 @ 23:32) (69 - 115)  BP: 95/52 (05-16-18 @ 23:32) (95/52 - 138/75)  RR: 18 (05-16-18 @ 23:32) (18 - 18)  SpO2: 96% (05-16-18 @ 23:32) (96% - 100%)  Wt(kg): --Vital Signs Last 24 Hrs  T(C): 36.5 (16 May 2018 23:32), Max: 36.7 (16 May 2018 06:22)  T(F): 97.7 (16 May 2018 23:32), Max: 98.1 (16 May 2018 06:22)  HR: 72 (16 May 2018 23:32) (69 - 115)  BP: 95/52 (16 May 2018 23:32) (95/52 - 138/75)  BP(mean): --  RR: 18 (16 May 2018 23:32) (18 - 18)  SpO2: 96% (16 May 2018 23:32) (96% - 100%)    PHYSICAL EXAM:  GENERAL: NAD, well-groomed, well-developed  HEAD:  Atraumatic, Normocephalic  NECK: Supple, No JVD, Normal thyroid  NERVOUS SYSTEM:  Alert & Oriented X3, Good concentration; Motor Strength 5/5 B/L upper and lower extremities; DTRs 2+ intact and symmetric  CHEST/LUNG: Clear to percussion bilaterally; No rales, rhonchi, wheezing, or rubs  HEART: Regular rate and rhythm; No murmurs, rubs, or gallops  ABDOMEN: Soft, Nontender, Nondistended; Bowel sounds present  EXTREMITIES:  2+ Peripheral Pulses, No clubbing, cyanosis, or edema    Consultant(s) Notes Reviewed:  [x ] YES  [ ] NO    Discussed with Consultants/Other Providers/Residents [ x] YES     LABS                         9.4    8.62  )-----------( 168      ( 16 May 2018 20:43 )             29.0     05-16    138  |  101  |  23<H>  ----------------------------<  102<H>  4.8   |  25  |  1.3    Ca    8.7      16 May 2018 20:43    TPro  6.3  /  Alb  4.1  /  TBili  0.8  /  DBili  x   /  AST  26  /  ALT  22  /  AlkPhos  93  05-15    PT/INR - ( 15 May 2018 16:17 )   PT: 28.70 sec;   INR: 2.61 ratio         PTT - ( 15 May 2018 16:17 )  PTT:26.5 sec      LIVER FUNCTIONS - ( 15 May 2018 16:17 )  Alb: 4.1 g/dL / Pro: 6.3 g/dL / ALK PHOS: 93 U/L / ALT: 22 U/L / AST: 26 U/L / GGT: x           CAPILLARY BLOOD GLUCOSE            RADIOLOGY & ADDITIONAL TESTS:    Imaging Personally Reviewed:  [x ] YES  [ ] NO    MEDICATIONS  (STANDING):  amLODIPine   Tablet 5 milliGRAM(s) Oral daily  aspirin  chewable 81 milliGRAM(s) Oral daily  atorvastatin 40 milliGRAM(s) Oral at bedtime  losartan 50 milliGRAM(s) Oral daily  metoprolol succinate ER 50 milliGRAM(s) Oral daily    MEDICATIONS  (PRN):  acetaminophen   Tablet. 650 milliGRAM(s) Oral every 6 hours PRN Moderate Pain (4 - 6)      HEALTH ISSUES - PROBLEM Dx:

## 2018-05-17 NOTE — CONSULT NOTE ADULT - ASSESSMENT
IMPRESSION: Rehab of gait ab fall head trauma -now rapid a fib    PRECAUTIONS: [  x  ] Cardiac  [    ] Respiratory  [    ] Seizures [    ] Contact Isolation  [    ] Droplet Isolation  [    ] Other    Weight Bearing Status:     RECOMMENDATION:  CARDIO TO ADDRESS RAPID A FIB- CAN USE MECLIZINE FOR DIZZINESS-                                   USE WALKER AT HOME FOR SAFETY  Out of Bed to Chair     DVT/Decubiti Prophylaxis    REHAB PLAN:     [     ] Bedside P/T 3-5 times a week   [     ] Bedside O/T  2-3 times a week   [ x    ] No Rehab Therapy Indicated   [     ]  Speech Therapy   Conditioning/ROM                                 ADL                                            SEEN BY PT ALREADY- CLEARED FOR DC HOME  Bed Mobility                                            Conditioning/ROM                    NO FURTHER PT ON ACUTE CARE NEEDED  Transfers                                                  Bed Mobility  Sitting /Standing Balance                      Transfers                                        Gait Training                                            Sitting/Standing Balance  Stair Training [   ]Applicable                 Home equipment Eval                                                                     Splinting  [   ] Only      GOALS:   ADL   [    ]   Independent         Transfers  [    ] Independent            Ambulation  [     ] Independent     [     ] With device                            [    ]  CG                                               [    ]  CG                                                    [     ] CG                            [    ] Min A                                          [    ] Min A                                                [     ] Min  A          DISCHARGE PLAN:   [     ]  Good candidate for Intensive Rehabilitation/Hospital based-[facility]A [facility]                                             Will tolerate 3hrs Intensive Rehab Daily                                       [      ]  Short Term Rehab in Skilled Nursing Facility                                       [   x   ]  Home with Outpatient or VN services                                         [      ]  Possible Candidate for Intensive Hospital based Rehab

## 2018-05-17 NOTE — DISCHARGE NOTE ADULT - CARE PROVIDER_API CALL
Kiersten Youngblood (MD), Surgery  [facility] [place] [address]  Phone: [phone]  Fax: [phone]

## 2018-05-17 NOTE — PROGRESS NOTE ADULT - SUBJECTIVE AND OBJECTIVE BOX
LENGTH OF HOSPITAL STAY: 2d    CHIEF COMPLAINT:   Patient is a 77y old  Female who presents with a chief complaint of s/p fall down 1 step (15 May 2018 18:39)      HISTORY OF PRESENTING ILLNESS:    HPI:  77 year female, on eliquis for afib, s/p trip & fall down 1 step, +HT, -LOC, c/o nausea and dizziness (15 May 2018 18:39)    PAST MEDICAL & SURGICAL HISTORY  PAST MEDICAL & SURGICAL HISTORY:  High cholesterol  Afib  HTN (hypertension)    SOCIAL HISTORY:    ALLERGIES:  No Known Allergies    MEDICATIONS:  STANDING MEDICATIONS  amLODIPine   Tablet 5 milliGRAM(s) Oral daily  aspirin  chewable 81 milliGRAM(s) Oral daily  atorvastatin 40 milliGRAM(s) Oral at bedtime  losartan 50 milliGRAM(s) Oral daily  metoprolol succinate ER 50 milliGRAM(s) Oral daily    PRN MEDICATIONS  acetaminophen   Tablet. 650 milliGRAM(s) Oral every 6 hours PRN    VITALS:   T(F): 97.7  HR: 69  BP: 112/55  RR: 18  SpO2: 96%    LABS:                        9.5    7.97  )-----------( 163      ( 17 May 2018 06:09 )             29.9     05-17    137  |  102  |  20  ----------------------------<  87  4.4   |  24  |  1.1    Ca    9.0      17 May 2018 06:09    TPro  6.3  /  Alb  4.1  /  TBili  0.8  /  DBili  x   /  AST  26  /  ALT  22  /  AlkPhos  93  05-15    PT/INR - ( 15 May 2018 16:17 )   PT: 28.70 sec;   INR: 2.61 ratio         PTT - ( 15 May 2018 16:17 )  PTT:26.5 sec              RADIOLOGY:    PHYSICAL EXAM:  GEN: No acute distress  HEENT: LACERATION WITH STAPLES ON RIGHT SIDE   LUNGS: Clear to auscultation bilaterally   HEART: S1/S2 present. RRR.   ABD: Soft, non-tender, non-distended. Bowel sounds present  EXT: no edema , clubbing cyanosis  NEURO: AAOX3; no deficits

## 2018-05-17 NOTE — DISCHARGE NOTE ADULT - MEDICATION SUMMARY - MEDICATIONS TO TAKE
I will START or STAY ON the medications listed below when I get home from the hospital:    aspirin 81 mg oral tablet, chewable  -- 1 tab(s) by mouth once a day  -- Indication: For cad    losartan 50 mg oral tablet  -- 1 tab(s) by mouth once a day  -- Indication: For HTN (hypertension)    Xarelto 20 mg oral tablet  -- 1 tab(s) by mouth once a day (in the evening)  -- Indication: For A fib    atorvastatin 40 mg oral tablet  -- 1 tab(s) by mouth once a day  -- Indication: For Dld    metoprolol succinate 100 mg oral tablet, extended release  -- 1 tab(s) by mouth once a day  -- Indication: For A fib I will START or STAY ON the medications listed below when I get home from the hospital:    losartan 50 mg oral tablet  -- 1 tab(s) by mouth once a day  -- Indication: For HTN (hypertension)    Xarelto 20 mg oral tablet  -- 1 tab(s) by mouth once a day (in the evening)  -- Indication: For A fib    atorvastatin 40 mg oral tablet  -- 1 tab(s) by mouth once a day  -- Indication: For Dld    metoprolol succinate 100 mg oral tablet, extended release  -- 1 tab(s) by mouth 2 times a day  -- Indication: For Afib

## 2018-05-17 NOTE — DISCHARGE NOTE ADULT - PLAN OF CARE
trauma work up negative - prevent from recurrence.  - use a walker while ambulating.  - f/u with surgery in 1 wk for suture removal. stable -c/w xarelto at home. rate controlled now -pt marcia started on metoprolol 100 mg bid with better control in HR .  -Will c/w xarelto.

## 2018-05-17 NOTE — DISCHARGE NOTE ADULT - HOSPITAL COURSE
Patient is a 77y old  Female who presents with a chief complaint of s/p fall down 1 step , trauma work up was negative, pt had a laceration to head , staples were applied by surgery.  pt seen by NS cleared for AC ; pt can resume xarelto at home.  pt was also seen by  PT recommended use of rolling walker while ambulation.  pt is stable to be d/c home today Patient is a 77y old  Female who presents with a chief complaint of s/p fall down 1 step , trauma work up was negative, pt had a laceration to head , staples were applied by surgery.  pt seen by NS cleared for AC ; pt can resume xarelto at home.  pt was also seen by  PT recommended use of rolling walker while ambulation.  pt is stable to be d/c home on 5/17 /18 but then was found to have afib with RVR, on further inquiry it was found that pt was recommended to take metoprolol 100mgbid , but pt was getting 50 mg bid. heart rate was better controlled with metoprolol 100 BID. Pt is now stable to be d/c home on metoprolol 100 mg bid, losartan and aspirin and amlodipine were held.

## 2018-05-17 NOTE — DISCHARGE NOTE ADULT - PATIENT PORTAL LINK FT
You can access the [facility] Patient Portal, offered by [facility], by registering with the following website: [email or web address]

## 2018-05-17 NOTE — DISCHARGE NOTE ADULT - CARE PLAN
Principal Discharge DX:	Fall, initial encounter  Goal:	trauma work up negative  Assessment and plan of treatment:	- prevent from recurrence.  - use a walker while ambulating.  - f/u with surgery in 1 wk for suture removal.  Secondary Diagnosis:	Chronic atrial fibrillation  Goal:	stable  Assessment and plan of treatment:	-c/w xarelto at home. Principal Discharge DX:	Fall, initial encounter  Goal:	trauma work up negative  Assessment and plan of treatment:	- prevent from recurrence.  - use a walker while ambulating.  - f/u with surgery in 1 wk for suture removal.  Secondary Diagnosis:	Chronic atrial fibrillation  Goal:	rate controlled now  Assessment and plan of treatment:	-pt marcia started on metoprolol 100 mg bid with better control in HR .  -Will c/w xarelto.

## 2018-05-17 NOTE — ED ADULT NURSE REASSESSMENT NOTE - NS ED NURSE REASSESS COMMENT FT1
IOC 3001 made aware patient was hypotensive (90s systolic), pt bp now 112/55. as per IOC 3001, do not administer Amlodipine and Losartan at this time, just Metoprolol. pt asymptomatic and alert and oriented x 3, will continue to monitor and assess.
pt assessed and alert and oriented x 3. no complaints at this time. fall risk precautions maintained. call bell in reach, pt instructed to ring call bell for assistance. will closely monitor and assess.
Patient brought in as a trauma alert at 14:38. S/p fall down three stairs and hit right side of head. Right side head laceration. Cervical collar in place. Patient denies LOC. Alert and oriented at this time.

## 2018-05-17 NOTE — PROGRESS NOTE ADULT - ATTENDING COMMENTS
Patient is a 77y old  Female who presents with a chief complaint of s/p fall down 1 step (15 May 2018 18:39)  She still feels weak and dizzy on ambulation.    1. Head trauma      s/p trauma work-up. Cleared by trauma      PT/rehab evals     Monitor CBC    2.  A-fib:     Metoprolol 100 mg q12h     Resume Xarelto post Neuro SX eval    DC planning

## 2018-05-17 NOTE — CONSULT NOTE ADULT - SUBJECTIVE AND OBJECTIVE BOX
Patient is a 77y old  Female who presents with a chief complaint of s/p fall down 1 step (17 May 2018 13:56)    HPI:  77 year female, on eliquis for afib, s/p trip & fall down 1 step, +HT, -LOC, c/o nausea and dizziness (15 May 2018 18:39)      PAST MEDICAL & SURGICAL HISTORY:  High cholesterol  Afib  HTN (hypertension)      Hospital Course: scalp laceration sutured- trauma mayorga otherwise negative- Hx chronic LBP- no c/o at present  HR now 130-160- Rapid A Fib- cardio to see  seen by Neurosurgery and cleared for anticoagulation  TODAY'S SUBJECTIVE & REVIEW OF SYMPTOMS:     Constitutional WNL   Cardio Sl SOB   Resp WNL   GI WNL  Heme WNL  Endo WNL  Skin WNL  MSK WNL  Neuro dizziness/ nausea  Cognitive WNL  Psych WNL      MEDICATIONS  (STANDING):  amLODIPine   Tablet 5 milliGRAM(s) Oral daily  aspirin  chewable 81 milliGRAM(s) Oral daily  atorvastatin 40 milliGRAM(s) Oral at bedtime  losartan 50 milliGRAM(s) Oral daily  metoprolol succinate ER 50 milliGRAM(s) Oral daily  pantoprazole    Tablet 40 milliGRAM(s) Oral before breakfast    MEDICATIONS  (PRN):  acetaminophen   Tablet. 650 milliGRAM(s) Oral every 6 hours PRN Moderate Pain (4 - 6)  ondansetron Injectable 4 milliGRAM(s) IV Push every 6 hours PRN Nausea and/or Vomiting      FAMILY HISTORY:      Allergies    No Known Allergies    Intolerances        SOCIAL HISTORY:    [    ] Etoh  [    ] Smoking  [    ] Substance abuse     Home Environment:  [    ] Home Alone  [  x  ] Lives with Family  [    ] Home Health Aid    Dwelling:  [ x   ] Apartment  [    ] Private House  [    ] Adult Home  [    ] Skilled Nursing Facility      [    ] Short Term  [    ] Long Term  [  x  ] Stairs                           [    ] Elevator     FUNCTIONAL STATUS PTA: (Check all that apply)  Ambulation: [  x   ]Independent    [    ] Dependent     [    ] Non-Ambulatory  Assistive Device: [  x  ] SA Cane  [    ]  Q Cane  [    ] Walker  [    ]  Wheelchair  ADL : [ x   ] Independent  [    ]  Dependent   USES CANE OCC/ HAS WALKER IN HOUSE    Vital Signs Last 24 Hrs  T(C): 36.5 (16 May 2018 23:32), Max: 36.5 (16 May 2018 23:32)  T(F): 97.7 (16 May 2018 23:32), Max: 97.7 (16 May 2018 23:32)  HR: 150 (17 May 2018 16:06) (69 - 150)  BP: 130/70 (17 May 2018 16:06) (95/52 - 130/70)  BP(mean): --  RR: 18 (17 May 2018 16:06) (18 - 18)  SpO2: 98% (17 May 2018 16:06) (96% - 98%)      PHYSICAL EXAM: Alert & Oriented X3  GENERAL: NAD, well-groomed, well-developed  HEAD:  scalp lac stapled  EYES: EOMI, PERRLA, conjunctiva and sclera clear  NECK: Supple, No JVD, Normal thyroid  CHEST/LUNG: Clear to percussion bilaterally; No rales, rhonchi, wheezing, or rubs  HEART: s1s2 irreg,irreg +tacchycardic  ABDOMEN: Soft, Nontender, Nondistended; Bowel sounds present  EXTREMITIES:  2+ Peripheral Pulses, No clubbing, cyanosis, or edema    NERVOUS SYSTEM:  Cranial Nerves 2-12 intact [  x  ] Abnormal  [    ]  ROM: WFL all extremities [ x   ]  Abnormal [     ]  Motor Strength: WFL all extremities  [ x   ]  Abnormal [    ]  Sensation: intact to light touch [  x  ] Abnormal [    ]      FUNCTIONAL STATUS:  Bed Mobility: [   ]  Independent [   x ]  Supervision [    ]  Needs Assistance [  ]  N/A  Transfers: [    ]  Independent [  x  ]  Supervision [    ]  Needs Assistance [    ]  N/A    Ambulation:  [    ]  Independent [ x   ]  Supervision [    ]  Needs Assistance [    ]  N/A   ADL:  [  x  ]   Independent [    ] Requires Assistance [    ] N/A   AMBULATES TO BATHROOM WITH STAFF  SEEN BY PT ON 5/16-SUPERVISION 200' WITH AND WITHOUT WALKER  LABS:                        9.5    7.97  )-----------( 163      ( 17 May 2018 06:09 )             29.9     05-17    137  |  102  |  20  ----------------------------<  87  4.4   |  24  |  1.1    Ca    9.0      17 May 2018 06:09            RADIOLOGY & ADDITIONAL STUDIES:

## 2018-05-18 VITALS
RESPIRATION RATE: 20 BRPM | DIASTOLIC BLOOD PRESSURE: 71 MMHG | TEMPERATURE: 98 F | OXYGEN SATURATION: 97 % | HEART RATE: 80 BPM | SYSTOLIC BLOOD PRESSURE: 122 MMHG

## 2018-05-18 LAB
ALBUMIN SERPL ELPH-MCNC: 3.8 G/DL — SIGNIFICANT CHANGE UP (ref 3.5–5.2)
ALP SERPL-CCNC: 83 U/L — SIGNIFICANT CHANGE UP (ref 30–115)
ALT FLD-CCNC: 13 U/L — SIGNIFICANT CHANGE UP (ref 0–41)
ANION GAP SERPL CALC-SCNC: 11 MMOL/L — SIGNIFICANT CHANGE UP (ref 7–14)
AST SERPL-CCNC: 13 U/L — SIGNIFICANT CHANGE UP (ref 0–41)
BASOPHILS # BLD AUTO: 0.08 K/UL — SIGNIFICANT CHANGE UP (ref 0–0.2)
BASOPHILS NFR BLD AUTO: 1 % — SIGNIFICANT CHANGE UP (ref 0–1)
BILIRUB SERPL-MCNC: 0.9 MG/DL — SIGNIFICANT CHANGE UP (ref 0.2–1.2)
BUN SERPL-MCNC: 17 MG/DL — SIGNIFICANT CHANGE UP (ref 10–20)
CALCIUM SERPL-MCNC: 8.9 MG/DL — SIGNIFICANT CHANGE UP (ref 8.5–10.1)
CHLORIDE SERPL-SCNC: 102 MMOL/L — SIGNIFICANT CHANGE UP (ref 98–110)
CO2 SERPL-SCNC: 26 MMOL/L — SIGNIFICANT CHANGE UP (ref 17–32)
CREAT SERPL-MCNC: 1.1 MG/DL — SIGNIFICANT CHANGE UP (ref 0.7–1.5)
EOSINOPHIL # BLD AUTO: 0.28 K/UL — SIGNIFICANT CHANGE UP (ref 0–0.7)
EOSINOPHIL NFR BLD AUTO: 3.4 % — SIGNIFICANT CHANGE UP (ref 0–8)
GLUCOSE SERPL-MCNC: 84 MG/DL — SIGNIFICANT CHANGE UP (ref 70–99)
HCT VFR BLD CALC: 31.3 % — LOW (ref 37–47)
HGB BLD-MCNC: 10.2 G/DL — LOW (ref 12–16)
IMM GRANULOCYTES NFR BLD AUTO: 0.5 % — HIGH (ref 0.1–0.3)
LYMPHOCYTES # BLD AUTO: 1.96 K/UL — SIGNIFICANT CHANGE UP (ref 1.2–3.4)
LYMPHOCYTES # BLD AUTO: 24.1 % — SIGNIFICANT CHANGE UP (ref 20.5–51.1)
MCHC RBC-ENTMCNC: 32.6 G/DL — SIGNIFICANT CHANGE UP (ref 32–37)
MCHC RBC-ENTMCNC: 33.8 PG — HIGH (ref 27–31)
MCV RBC AUTO: 103.6 FL — HIGH (ref 81–99)
MONOCYTES # BLD AUTO: 0.88 K/UL — HIGH (ref 0.1–0.6)
MONOCYTES NFR BLD AUTO: 10.8 % — HIGH (ref 1.7–9.3)
NEUTROPHILS # BLD AUTO: 4.9 K/UL — SIGNIFICANT CHANGE UP (ref 1.4–6.5)
NEUTROPHILS NFR BLD AUTO: 60.2 % — SIGNIFICANT CHANGE UP (ref 42.2–75.2)
PLATELET # BLD AUTO: 184 K/UL — SIGNIFICANT CHANGE UP (ref 130–400)
POTASSIUM SERPL-MCNC: 5.3 MMOL/L — HIGH (ref 3.5–5)
POTASSIUM SERPL-SCNC: 5.3 MMOL/L — HIGH (ref 3.5–5)
PROT SERPL-MCNC: 5.9 G/DL — LOW (ref 6–8)
RBC # BLD: 3.02 M/UL — LOW (ref 4.2–5.4)
RBC # FLD: 13.8 % — SIGNIFICANT CHANGE UP (ref 11.5–14.5)
SODIUM SERPL-SCNC: 139 MMOL/L — SIGNIFICANT CHANGE UP (ref 135–146)
WBC # BLD: 8.14 K/UL — SIGNIFICANT CHANGE UP (ref 4.8–10.8)
WBC # FLD AUTO: 8.14 K/UL — SIGNIFICANT CHANGE UP (ref 4.8–10.8)

## 2018-05-18 RX ORDER — ASPIRIN/CALCIUM CARB/MAGNESIUM 324 MG
1 TABLET ORAL
Qty: 0 | Refills: 0 | COMMUNITY

## 2018-05-18 RX ORDER — METOPROLOL TARTRATE 50 MG
1 TABLET ORAL
Qty: 0 | Refills: 0 | COMMUNITY

## 2018-05-18 RX ORDER — RIVAROXABAN 15 MG-20MG
1 KIT ORAL
Qty: 0 | Refills: 0 | COMMUNITY

## 2018-05-18 RX ADMIN — Medication 650 MILLIGRAM(S): at 11:59

## 2018-05-18 RX ADMIN — Medication 100 MILLIGRAM(S): at 06:54

## 2018-05-18 RX ADMIN — LOSARTAN POTASSIUM 50 MILLIGRAM(S): 100 TABLET, FILM COATED ORAL at 06:54

## 2018-05-18 RX ADMIN — PANTOPRAZOLE SODIUM 40 MILLIGRAM(S): 20 TABLET, DELAYED RELEASE ORAL at 09:21

## 2018-05-18 NOTE — PROGRESS NOTE ADULT - SUBJECTIVE AND OBJECTIVE BOX
CARLA MARES  77y  Female    Patient is a 77y old  Female who presents with a chief complaint of s/p fall down 1 step (17 May 2018 13:56)      INTERVAL HPI/OVERNIGHT EVENTS: None    T(C): 36.6 (05-17-18 @ 23:35), Max: 36.7 (05-17-18 @ 19:40)  HR: 89 (05-17-18 @ 23:35) (69 - 150)  BP: 130/56 (05-17-18 @ 23:35) (112/55 - 130/74)  RR: 20 (05-17-18 @ 23:35) (18 - 20)  SpO2: 97% (05-17-18 @ 23:35) (97% - 100%)  Wt(kg): --Vital Signs Last 24 Hrs  T(C): 36.6 (17 May 2018 23:35), Max: 36.7 (17 May 2018 19:40)  T(F): 97.8 (17 May 2018 23:35), Max: 98.1 (17 May 2018 19:40)  HR: 89 (17 May 2018 23:35) (69 - 150)  BP: 130/56 (17 May 2018 23:35) (112/55 - 130/74)  BP(mean): --  RR: 20 (17 May 2018 23:35) (18 - 20)  SpO2: 97% (17 May 2018 23:35) (97% - 100%)    PHYSICAL EXAM:  GENERAL: NAD, well-groomed, well-developed  HEAD:  Atraumatic, Normocephalic  NECK: Supple, No JVD, Normal thyroid  NERVOUS SYSTEM:  Alert & Oriented X3, Good concentration; Motor Strength 5/5 B/L upper and lower extremities; DTRs 2+ intact and symmetric  CHEST/LUNG: Clear to percussion bilaterally; No rales, rhonchi, wheezing, or rubs  HEART: Regular rate and rhythm; No murmurs, rubs, or gallops  ABDOMEN: Soft, Nontender, Nondistended; Bowel sounds present  EXTREMITIES:  2+ Peripheral Pulses, No clubbing, cyanosis, or edema    Consultant(s) Notes Reviewed:  [x ] YES  [ ] NO    Discussed with Consultants/Other Providers/Residents [ x] YES     LABS                         9.5    7.97  )-----------( 163      ( 17 May 2018 06:09 )             29.9     05-17    137  |  102  |  20  ----------------------------<  87  4.4   |  24  |  1.1    Ca    9.0      17 May 2018 06:09              CAPILLARY BLOOD GLUCOSE            RADIOLOGY & ADDITIONAL TESTS:    Imaging Personally Reviewed:  [x ] YES  [ ] NO    MEDICATIONS  (STANDING):  atorvastatin 40 milliGRAM(s) Oral at bedtime  losartan 50 milliGRAM(s) Oral daily  metoprolol tartrate 100 milliGRAM(s) Oral two times a day  pantoprazole    Tablet 40 milliGRAM(s) Oral before breakfast    MEDICATIONS  (PRN):  acetaminophen   Tablet. 650 milliGRAM(s) Oral every 6 hours PRN Moderate Pain (4 - 6)  ondansetron Injectable 4 milliGRAM(s) IV Push every 6 hours PRN Nausea and/or Vomiting      HEALTH ISSUES - PROBLEM Dx:

## 2018-05-18 NOTE — PROGRESS NOTE ADULT - ATTENDING COMMENTS
Patient is a 77y old  Female who presents with a chief complaint of s/p fall down 1 step (15 May 2018 18:39)  She still feels weak and dizzy on ambulation.    1. Head trauma      s/p trauma work-up. Cleared by trauma      PT/rehab eval appreciated- dc home w/ services     Monitor CBC    2.  A-fib:     Metoprolol 100 mg q12h     Resumed Eliquis / Norvasc dc by cardio    DC planning

## 2018-05-22 DIAGNOSIS — S01.01XA LACERATION WITHOUT FOREIGN BODY OF SCALP, INITIAL ENCOUNTER: ICD-10-CM

## 2018-05-22 DIAGNOSIS — I10 ESSENTIAL (PRIMARY) HYPERTENSION: ICD-10-CM

## 2018-05-22 DIAGNOSIS — I48.2 CHRONIC ATRIAL FIBRILLATION: ICD-10-CM
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
## 2018-05-22 DIAGNOSIS — Z79.01 LONG TERM (CURRENT) USE OF ANTICOAGULANTS: ICD-10-CM
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
## 2018-05-22 DIAGNOSIS — M54.5 LOW BACK PAIN: ICD-10-CM

## 2018-05-22 DIAGNOSIS — E78.5 HYPERLIPIDEMIA, UNSPECIFIED: ICD-10-CM

## 2018-05-22 DIAGNOSIS — G89.29 OTHER CHRONIC PAIN: ICD-10-CM
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
## 2018-06-05 ENCOUNTER — APPOINTMENT (OUTPATIENT)
Dept: CARDIOLOGY | Facility: CLINIC | Age: 78
End: 2018-06-05

## 2018-06-05 VITALS
BODY MASS INDEX: 24.84 KG/M2 | HEART RATE: 98 BPM | DIASTOLIC BLOOD PRESSURE: 70 MMHG | HEIGHT: 62 IN | SYSTOLIC BLOOD PRESSURE: 102 MMHG | WEIGHT: 135 LBS

## 2018-06-05 PROBLEM — E78.00 PURE HYPERCHOLESTEROLEMIA, UNSPECIFIED: Chronic | Status: ACTIVE | Noted: 2018-05-15

## 2018-06-05 PROBLEM — I48.91 UNSPECIFIED ATRIAL FIBRILLATION: Chronic | Status: ACTIVE | Noted: 2018-05-15

## 2018-06-05 PROBLEM — I10 ESSENTIAL (PRIMARY) HYPERTENSION: Chronic | Status: ACTIVE | Noted: 2018-05-15

## 2018-10-09 ENCOUNTER — NON-APPOINTMENT (OUTPATIENT)
Age: 78
End: 2018-10-09

## 2018-10-09 ENCOUNTER — APPOINTMENT (OUTPATIENT)
Dept: CARDIOLOGY | Facility: CLINIC | Age: 78
End: 2018-10-09

## 2018-10-09 VITALS
HEIGHT: 62 IN | WEIGHT: 137 LBS | SYSTOLIC BLOOD PRESSURE: 102 MMHG | HEART RATE: 89 BPM | DIASTOLIC BLOOD PRESSURE: 70 MMHG | BODY MASS INDEX: 25.21 KG/M2

## 2019-03-05 ENCOUNTER — NON-APPOINTMENT (OUTPATIENT)
Age: 79
End: 2019-03-05

## 2019-03-05 ENCOUNTER — APPOINTMENT (OUTPATIENT)
Dept: CARDIOLOGY | Facility: CLINIC | Age: 79
End: 2019-03-05

## 2019-03-05 VITALS
HEIGHT: 62 IN | DIASTOLIC BLOOD PRESSURE: 70 MMHG | BODY MASS INDEX: 25.95 KG/M2 | SYSTOLIC BLOOD PRESSURE: 110 MMHG | WEIGHT: 141 LBS | HEART RATE: 109 BPM

## 2019-03-05 NOTE — DISCUSSION/SUMMARY
[FreeTextEntry1] : - Continue Metoprolol 100 mg q12\par - Continue Xarelto\par - Follow up in 6 months / prn symptoms

## 2019-03-05 NOTE — PHYSICAL EXAM
[General Appearance - Well Developed] : well developed [General Appearance - In No Acute Distress] : no acute distress [Respiration, Rhythm And Depth] : normal respiratory rhythm and effort [Auscultation Breath Sounds / Voice Sounds] : lungs were clear to auscultation bilaterally [Normal] : the heart rate was normal [Irregularly Irregular] : the rhythm was irregularly irregular [Abdomen Soft] : soft [Abdomen Tenderness] : non-tender [] : no hepato-splenomegaly [Abdomen Mass (___ Cm)] : no abdominal mass palpated [Nail Clubbing] : no clubbing of the fingernails [Cyanosis, Localized] : no localized cyanosis [Skin Color & Pigmentation] : normal skin color and pigmentation [No Skin Ulcers] : no skin ulcer [Oriented To Time, Place, And Person] : oriented to person, place, and time [FreeTextEntry1] : no JVD

## 2019-03-05 NOTE — HISTORY OF PRESENT ILLNESS
[FreeTextEntry1] : Rate controlled on Metoprolol 100 mg q12\par No s/s bleeding on Xarelto\par \par Left knee arthritis - possible torn meniscus - may have surgery in August\par Left arm pain after carrying heavy groceries\par \par No history of MI or CHF\par \par >4 METS\par \par Teacher at [employer]\par \par 84 yo sister - Pancreatic cancer - planned RT\par 83 yo sister - Dementia\par \par \par Echo (2/6/2018): EF 60%, Mod AI, Mod MR, Mod TR, Mod PHTN\par

## 2019-07-10 ENCOUNTER — MEDICATION RENEWAL (OUTPATIENT)
Age: 79
End: 2019-07-10

## 2019-07-10 ENCOUNTER — RX RENEWAL (OUTPATIENT)
Age: 79
End: 2019-07-10

## 2019-07-10 RX ORDER — RIVAROXABAN 20 MG/1
20 TABLET, FILM COATED ORAL
Qty: 90 | Refills: 0 | Status: DISCONTINUED | COMMUNITY
Start: 2018-05-04 | End: 2019-07-10

## 2019-08-22 ENCOUNTER — MEDICATION RENEWAL (OUTPATIENT)
Age: 79
End: 2019-08-22

## 2019-08-26 ENCOUNTER — MEDICATION RENEWAL (OUTPATIENT)
Age: 79
End: 2019-08-26

## 2019-10-30 ENCOUNTER — APPOINTMENT (OUTPATIENT)
Dept: CARDIOLOGY | Facility: CLINIC | Age: 79
End: 2019-10-30
Payer: MEDICARE

## 2019-10-30 VITALS
DIASTOLIC BLOOD PRESSURE: 80 MMHG | SYSTOLIC BLOOD PRESSURE: 126 MMHG | WEIGHT: 136 LBS | HEIGHT: 62 IN | BODY MASS INDEX: 25.03 KG/M2

## 2019-10-30 PROCEDURE — 93000 ELECTROCARDIOGRAM COMPLETE: CPT

## 2019-10-30 PROCEDURE — 99213 OFFICE O/P EST LOW 20 MIN: CPT

## 2019-10-30 NOTE — PHYSICAL EXAM
[General Appearance - Well Developed] : well developed [General Appearance - In No Acute Distress] : no acute distress [Respiration, Rhythm And Depth] : normal respiratory rhythm and effort [Auscultation Breath Sounds / Voice Sounds] : lungs were clear to auscultation bilaterally [Abdomen Soft] : soft [Abdomen Tenderness] : non-tender [Abdomen Mass (___ Cm)] : no abdominal mass palpated [Nail Clubbing] : no clubbing of the fingernails [Cyanosis, Localized] : no localized cyanosis [Skin Color & Pigmentation] : normal skin color and pigmentation [No Skin Ulcers] : no skin ulcer [Oriented To Time, Place, And Person] : oriented to person, place, and time [Normal Conjunctiva] : the conjunctiva exhibited no abnormalities [Eyelids - No Xanthelasma] : the eyelids demonstrated no xanthelasmas [] : no respiratory distress [Exaggerated Use Of Accessory Muscles For Inspiration] : no accessory muscle use [FreeTextEntry1] : irregular, no edema

## 2019-11-26 ENCOUNTER — RX RENEWAL (OUTPATIENT)
Age: 79
End: 2019-11-26

## 2020-05-05 ENCOUNTER — APPOINTMENT (OUTPATIENT)
Dept: CARDIOLOGY | Facility: CLINIC | Age: 80
End: 2020-05-05
Payer: MEDICARE

## 2020-05-05 PROCEDURE — 99442: CPT | Mod: CR

## 2020-05-05 NOTE — HISTORY OF PRESENT ILLNESS
[Verbal consent obtained from patient] : the patient, [unfilled] [Time Spent: ___ minutes] : I have spent [unfilled] minutes with the patient on the telephone [FreeTextEntry1] : 80 yo F with h/o Persistent AF on Xarelto, HTN, HLD presents for follow up.  No palpitations.  Very rare SOB possibly anxiety.  No s/s bleeding.  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  Two sisters are in [facility].\par \par Plan:\par - Continue Toprol  mg BID\par - Continue Losartan\par - Continue Xarelto\par - Follow up 6 months

## 2020-05-29 ENCOUNTER — EMERGENCY (EMERGENCY)
Facility: HOSPITAL | Age: 80
LOS: 0 days | Discharge: HOME | End: 2020-05-29
Attending: EMERGENCY MEDICINE | Admitting: EMERGENCY MEDICINE
Payer: MEDICARE

## 2020-05-29 VITALS
HEIGHT: 65 IN | OXYGEN SATURATION: 100 % | SYSTOLIC BLOOD PRESSURE: 112 MMHG | HEART RATE: 96 BPM | WEIGHT: 145.06 LBS | RESPIRATION RATE: 18 BRPM | DIASTOLIC BLOOD PRESSURE: 80 MMHG | TEMPERATURE: 98 F

## 2020-05-29 VITALS
HEART RATE: 75 BPM | OXYGEN SATURATION: 98 % | DIASTOLIC BLOOD PRESSURE: 67 MMHG | RESPIRATION RATE: 18 BRPM | SYSTOLIC BLOOD PRESSURE: 127 MMHG

## 2020-05-29 DIAGNOSIS — I48.91 UNSPECIFIED ATRIAL FIBRILLATION: ICD-10-CM

## 2020-05-29 DIAGNOSIS — D64.9 ANEMIA, UNSPECIFIED: ICD-10-CM

## 2020-05-29 DIAGNOSIS — E78.00 PURE HYPERCHOLESTEROLEMIA, UNSPECIFIED: ICD-10-CM

## 2020-05-29 DIAGNOSIS — R06.02 SHORTNESS OF BREATH: ICD-10-CM

## 2020-05-29 DIAGNOSIS — Z79.899 OTHER LONG TERM (CURRENT) DRUG THERAPY: ICD-10-CM

## 2020-05-29 DIAGNOSIS — I10 ESSENTIAL (PRIMARY) HYPERTENSION: ICD-10-CM

## 2020-05-29 LAB
ACANTHOCYTES BLD QL SMEAR: SIGNIFICANT CHANGE UP
ALBUMIN SERPL ELPH-MCNC: 4.3 G/DL — SIGNIFICANT CHANGE UP (ref 3.5–5.2)
ALP SERPL-CCNC: 127 U/L — HIGH (ref 30–115)
ALT FLD-CCNC: 13 U/L — SIGNIFICANT CHANGE UP (ref 0–41)
ANION GAP SERPL CALC-SCNC: 13 MMOL/L — SIGNIFICANT CHANGE UP (ref 7–14)
APTT BLD: 35.8 SEC — SIGNIFICANT CHANGE UP (ref 27–39.2)
AST SERPL-CCNC: 31 U/L — SIGNIFICANT CHANGE UP (ref 0–41)
BASOPHILS # BLD AUTO: 0 K/UL — SIGNIFICANT CHANGE UP (ref 0–0.2)
BASOPHILS NFR BLD AUTO: 0 % — SIGNIFICANT CHANGE UP (ref 0–1)
BILIRUB SERPL-MCNC: 1.1 MG/DL — SIGNIFICANT CHANGE UP (ref 0.2–1.2)
BLD GP AB SCN SERPL QL: SIGNIFICANT CHANGE UP
BUN SERPL-MCNC: 36 MG/DL — HIGH (ref 10–20)
BURR CELLS BLD QL SMEAR: PRESENT — SIGNIFICANT CHANGE UP
CALCIUM SERPL-MCNC: 9.1 MG/DL — SIGNIFICANT CHANGE UP (ref 8.5–10.1)
CHLORIDE SERPL-SCNC: 102 MMOL/L — SIGNIFICANT CHANGE UP (ref 98–110)
CO2 SERPL-SCNC: 19 MMOL/L — SIGNIFICANT CHANGE UP (ref 17–32)
CREAT SERPL-MCNC: 1.5 MG/DL — SIGNIFICANT CHANGE UP (ref 0.7–1.5)
EOSINOPHIL # BLD AUTO: 0.19 K/UL — SIGNIFICANT CHANGE UP (ref 0–0.7)
EOSINOPHIL NFR BLD AUTO: 3 % — SIGNIFICANT CHANGE UP (ref 0–8)
GLUCOSE SERPL-MCNC: 104 MG/DL — HIGH (ref 70–99)
HCT VFR BLD CALC: 19.7 % — LOW (ref 37–47)
HGB BLD-MCNC: 5.6 G/DL — CRITICAL LOW (ref 12–16)
INR BLD: 2.94 RATIO — HIGH (ref 0.65–1.3)
IRON SATN MFR SERPL: 27 UG/DL — LOW (ref 35–150)
IRON SATN MFR SERPL: 7 % — LOW (ref 15–50)
LYMPHOCYTES # BLD AUTO: 1.07 K/UL — LOW (ref 1.2–3.4)
LYMPHOCYTES # BLD AUTO: 17 % — LOW (ref 20.5–51.1)
MAGNESIUM SERPL-MCNC: 2 MG/DL — SIGNIFICANT CHANGE UP (ref 1.8–2.4)
MANUAL SMEAR VERIFICATION: SIGNIFICANT CHANGE UP
MCHC RBC-ENTMCNC: 22.3 PG — LOW (ref 27–31)
MCHC RBC-ENTMCNC: 28.4 G/DL — LOW (ref 32–37)
MCV RBC AUTO: 78.5 FL — LOW (ref 81–99)
MONOCYTES # BLD AUTO: 0.31 K/UL — SIGNIFICANT CHANGE UP (ref 0.1–0.6)
MONOCYTES NFR BLD AUTO: 5 % — SIGNIFICANT CHANGE UP (ref 1.7–9.3)
NEUTROPHILS # BLD AUTO: 4.7 K/UL — SIGNIFICANT CHANGE UP (ref 1.4–6.5)
NEUTROPHILS NFR BLD AUTO: 75 % — SIGNIFICANT CHANGE UP (ref 42.2–75.2)
NRBC # BLD: 0 /100 — SIGNIFICANT CHANGE UP (ref 0–0)
NRBC # BLD: SIGNIFICANT CHANGE UP /100 WBCS (ref 0–0)
NT-PROBNP SERPL-SCNC: 6239 PG/ML — HIGH (ref 0–300)
PLAT MORPH BLD: NORMAL — SIGNIFICANT CHANGE UP
PLATELET # BLD AUTO: 384 K/UL — SIGNIFICANT CHANGE UP (ref 130–400)
POTASSIUM SERPL-MCNC: 5.1 MMOL/L — HIGH (ref 3.5–5)
POTASSIUM SERPL-SCNC: 5.1 MMOL/L — HIGH (ref 3.5–5)
PROT SERPL-MCNC: 6.4 G/DL — SIGNIFICANT CHANGE UP (ref 6–8)
PROTHROM AB SERPL-ACNC: 33.8 SEC — HIGH (ref 9.95–12.87)
RBC # BLD: 2.51 M/UL — LOW (ref 4.2–5.4)
RBC # FLD: 19.7 % — HIGH (ref 11.5–14.5)
RBC BLD AUTO: ABNORMAL
ROULEAUX BLD QL SMEAR: PRESENT — SIGNIFICANT CHANGE UP
SODIUM SERPL-SCNC: 134 MMOL/L — LOW (ref 135–146)
TIBC SERPL-MCNC: 370 UG/DL — SIGNIFICANT CHANGE UP (ref 220–430)
TROPONIN T SERPL-MCNC: <0.01 NG/ML — SIGNIFICANT CHANGE UP
UIBC SERPL-MCNC: 343 UG/DL — SIGNIFICANT CHANGE UP (ref 110–370)
WBC # BLD: 6.27 K/UL — SIGNIFICANT CHANGE UP (ref 4.8–10.8)
WBC # FLD AUTO: 6.27 K/UL — SIGNIFICANT CHANGE UP (ref 4.8–10.8)

## 2020-05-29 PROCEDURE — 71045 X-RAY EXAM CHEST 1 VIEW: CPT | Mod: 26

## 2020-05-29 PROCEDURE — 99284 EMERGENCY DEPT VISIT MOD MDM: CPT

## 2020-05-29 RX ORDER — METOPROLOL TARTRATE 50 MG
1 TABLET ORAL
Qty: 0 | Refills: 0 | DISCHARGE

## 2020-05-29 RX ORDER — FUROSEMIDE 40 MG
20 TABLET ORAL ONCE
Refills: 0 | Status: COMPLETED | OUTPATIENT
Start: 2020-05-29 | End: 2020-05-29

## 2020-05-29 NOTE — ED PROVIDER NOTE - PHYSICAL EXAMINATION
Physical Exam    Vital Signs: I have reviewed the initial vital signs.  Constitutional: well-nourished, appears stated age, no acute distress  Eyes: Conjunctiva pink, Sclera clear,  Cardiovascular: S1 and S2, regular rate, regular rhythm, well-perfused extremities, radial pulses equal and 2+, pedal pulses 2+ and equal   Respiratory: unlabored respiratory effort, clear to auscultation bilaterally no wheezing, rales and rhonchi  Gastrointestinal: soft, non-tender abdomen, no pulsatile mass, normal bowl sounds  Rectal" chaperoned with rn myra, brown stool per rectum.   Musculoskeletal: supple neck, no lower extremity edema, no midline tenderness  Integumentary: warm, dry, no rash  Neurologic: awake, alert, nvi

## 2020-05-29 NOTE — ED PROVIDER NOTE - CARE PROVIDER_API CALL
Nidhi Stafford  Internal Medicine  [address] [place] [address]  Phone: [phone]  Fax: [phone]  Follow Up Time: 1-3 Days

## 2020-05-29 NOTE — ED PROVIDER NOTE - OBJECTIVE STATEMENT
80 yo female, pmh of afib on ac, htn, hld, presents to ed for abnl lab. pt had outpt hgb of 5.6, pt co sob, mild, no specific pain or radiation. denies fever, chills, cp, le swelling, back pain, nvd, dark/ bloody stools.

## 2020-05-29 NOTE — ED PROVIDER NOTE - PATIENT PORTAL LINK FT
You can access the FollowMyHealth Patient Portal offered by [facility] by registering at the following website: [email or web address]. By joining [facility]’s FollowMyHealth portal, you will also be able to view your health information using other applications (apps) compatible with our system.

## 2020-05-29 NOTE — ED PROVIDER NOTE - PROGRESS NOTE DETAILS
JK: Official report of XR noted, pt without clinical SX or signs of pneumonia. ATTENDING NOTE:   78 y/o F c/o dyspnea on exertion x 1-2 weeks, which is worsening. Pt saw PMD 2 days ago, had blood work done at that time which showed a HGB of 5.6. Anemia was microcytic so pt was sent to ED for evaluation. Vital signs noted. NAD; abdomen NT; stool brown. Diagnostic testing reviewed, will transfuse 3 units of blood while in ED. Malcolm: tolerating transfusion well.  1st unit almost done.  in view of BNP, will give IV Lasix. Malcolm: chest clear. Pt without issue during all 3 transfusions, resting comfortably, VSS, will dc with fu with pmd negrete. pt comfortable with plan. sxs that should prompt return explained to pt, verbalizes understanding. tc f/u with PMD: pt with improved sx as of yesterday.

## 2020-05-29 NOTE — ED ADULT NURSE REASSESSMENT NOTE - NS ED NURSE REASSESS COMMENT FT1
blood transfusion underway, no s/s transfusion reaction, pt calm in bed, sitting up talking on cell phone requesting food which is supplied

## 2020-05-29 NOTE — ED ADULT NURSE NOTE - CHPI ED NUR SYMPTOMS NEG
no dizziness/no pain/no decreased eating/drinking/no fever/no tingling/no nausea/no vomiting/no chills

## 2020-05-29 NOTE — ED ADULT NURSE REASSESSMENT NOTE - NS ED NURSE REASSESS COMMENT FT1
SMITH and legs feel heavy x about 1 week, low H&H on outpatient labs, on xarelto for afib, denies any bleeding episodes, PA performs rectal exam and stool soft and brown, pt aaox4, pale, calm

## 2020-05-30 LAB — FERRITIN SERPL-MCNC: 13 NG/ML — LOW (ref 15–150)

## 2020-06-05 PROBLEM — K27.9 PEPTIC ULCER, SITE UNSPECIFIED, UNSPECIFIED AS ACUTE OR CHRONIC, WITHOUT HEMORRHAGE OR PERFORATION: Chronic | Status: ACTIVE | Noted: 2020-05-29

## 2020-06-12 ENCOUNTER — APPOINTMENT (OUTPATIENT)
Dept: CARDIOLOGY | Facility: CLINIC | Age: 80
End: 2020-06-12
Payer: MEDICARE

## 2020-06-12 DIAGNOSIS — I10 ESSENTIAL (PRIMARY) HYPERTENSION: ICD-10-CM

## 2020-06-12 PROCEDURE — 93306 TTE W/DOPPLER COMPLETE: CPT

## 2020-07-02 ENCOUNTER — APPOINTMENT (OUTPATIENT)
Dept: BREAST CENTER | Facility: CLINIC | Age: 80
End: 2020-07-02
Payer: MEDICARE

## 2020-07-02 VITALS
HEIGHT: 62 IN | BODY MASS INDEX: 25.03 KG/M2 | SYSTOLIC BLOOD PRESSURE: 124 MMHG | TEMPERATURE: 98.1 F | DIASTOLIC BLOOD PRESSURE: 80 MMHG | WEIGHT: 136 LBS

## 2020-07-02 DIAGNOSIS — D64.9 ANEMIA, UNSPECIFIED: ICD-10-CM

## 2020-07-02 DIAGNOSIS — Z80.3 FAMILY HISTORY OF MALIGNANT NEOPLASM OF BREAST: ICD-10-CM

## 2020-07-02 DIAGNOSIS — Z80.0 FAMILY HISTORY OF MALIGNANT NEOPLASM OF DIGESTIVE ORGANS: ICD-10-CM

## 2020-07-02 PROCEDURE — 93702 BIS XTRACELL FLUID ANALYSIS: CPT

## 2020-07-02 PROCEDURE — 99205 OFFICE O/P NEW HI 60 MIN: CPT

## 2020-07-09 NOTE — DATA REVIEWED
[FreeTextEntry1] : EXAM: MAMMO BILATERAL DIAGNOSTIC (baseline) \par             US BREAST\par DATE:   6/9/2020\par OVERALL IMPRESSION:\par Irregular lobular and spiculated ill-defined mass in the anterior 12:00 left breast, approximately measuring 3 x 3.5 cm with associated pleomorphic microcalcifications. The finding is suspicious and further assessment with ultrasound-guided core biopsy is recommended. The findings and recommendations were discussed with the patient by the dictating radiologist\par Faint grouped calcifications in the 11:00 left breast, these calcifications can be removed at the time of surgery via wire localization\par There is no mammographic or sonographic evidence of malignancy in the right breast.\par Biopsy of the left breast(s) is recommended. A letter will be sent to the patient to return for a biopsy.\par The findings and recommendations were discussed with the patient.\par BI-RADS 4: SUSPICIOUS\par MAMMO TOMOSYNTHESIS DIAGNOSTIC BILATERAL, US BREAST\par Clinical Breast Exam: Patient does report clinical breast exam in the last year.\par Clinical Indication: Patient has a palpable lump in the left breast. Family history of sister with breast cancer.\par Baseline mammogram\par MAMMOGRAM:\par Tomosynthesis and 2D imaging of the breast(s) were performed. Current study was also evaluated with a computer aided detection (CAD) system.\par Breast Density: Almost entirely fatty.\par No significant masses, calcifications, or other findings are seen in the right breast.\par There is an abnormal high density mass with spiculated margins and pleomorphic calcifications in the 12-1 o'clock anterior left breast. The mass is difficult to measure because of its irregular margins but approximates 3 x 3.5 cm in size.\par Additionally there are is a faint cluster of indeterminate calcifications in the 11:00 left breast, middle depth. These faint calcifications can be removed at the time of surgical excision.\par US BREAST COMPLETE BILATERAL\par Ultrasound evaluation was performed including examination of all four quadrants of the breast(s) and the retroareolar regions.\par Color flow, Gray scale and real-time ultrasound of both breasts was performed.\par No suspicious abnormalities were seen sonographically in the right breast. The right axilla is unremarkable.\par Ultrasound of the left breast reveals a lobular, spiculated and irregular mass in the 12:00 location at 3 cm from the nipple. The mass is difficult to measure exactly because of lobulation but likely spans 3 x 3.5 cm. On ultrasound there is a superficial lobular component that measures 0.7 cm. However, this is confluent with the main tumor mass. There is associated nipple inversion. Pleomorphic microcalcifications are associated with the mass. Further evaluation with ultrasound-guided core biopsy is recommended.\par The left axillary region is unremarkable\par Electronic Signature: I personally reviewed the images and agree with this report. Final Report: Dictated by and Signed by Attending Ciarra Ponce MD 6/9/2020 11:06 AM\par \par EXAM: US BREAST CORE BIOPSY \par  DATE:   6/17/2020\par IMPRESSION:\par Ultrasound guided needle biopsy of the left breast. Pathology pending.\par A final report will be issued when Pathology results are available. _\par US BREAST CORE BIOPSY LEFT, MAMMO DIGITAL POST PROCEDURE LEFT\par HISTORY: Ultrasound of the left breast dated 6/9/2020 revealed a suspicious ill-defined 3.5 cm mass at 12-1 o'clock anteriorly which was recommended for biopsy.\par Benefits, risks and alternatives to the procedure were explained to the patient and informed written consent was obtained.\par The patient denied taking aspirin or anticoagulants and stated no allergies to topical antiseptic solutions or local anesthetic.\par Utilizing universal protocol, site and patient verification was performed prior to starting the procedure.\par PROCEDURE: After sterile skin preparation, local anesthesia was achieved with 1% lidocaine. Under ultrasound guidance, a 12G vacuum assisted needle biopsy device was used to obtain multiple core specimens.\par After the procedure, a hourglass marking clip was deployed in the area of sampling.\par The patient tolerated the procedure well without immediate complications.\par POST PROCEDURE MAMMOGRAM FOR MARKER PLACEMENT\par A post-procedure mammogram was performed and demonstrates a clip in the appropriate location.\par Faint grouped calcifications in the 11:00 left breast are again demonstrated.\par Electronic Signature: I personally reviewed the images and agree with this report. Final Report: Dictated by and Signed by Attending Ciarra Ponce MD 6/17/2020 1:04 PM\par \par ADDENDUM\par Pathology Report Received From [facility]:\par \par The pathology report of the left breast ultrasound-guided core biopsy dated 6/17/2020 indicated that the target at 12:00, 3 cm from the nipple is MALIGNANT\par \par SPECIMEN NUMBER: [id number]\par COLLECTION DATE: 06/17/2020\par \par SPECIMENS:\par \par 1. BREAST, LEFT, 12 O'CLOCK, 3 CM, FN: CORE BIOPSY\par \par DIAGNOSIS:\par \par 1. BREAST, LEFT 12 O'CLOCK "3 CM FN", CORE BIOPSY:\par - INVASIVE DUCTAL CARCINOMA, WITH FOCAL PAPILLARY FEATURES, POORLY\par DIFFERENTIATED, ASSOCIATED WITH MICROCALCIFICATIONS\par - Histologic Grade (John Histologic Score)\par Glandular (Acinar)/Tubular Differentiation: Score 3 (<10% of tumor\par area forming glandular/tubular structures)\par Nuclear Pleomorphism: Score 3 (cells larger than normal with open\par vesicular nuclei, visible nucleoli, and moderate variability in both\par size and shape\par Mitotic Rate: Score 2\par Overall Grade: Grade 3 (scores of 6 or 7)\par Note: Biomarkers are requested and the results will be reported in\par an addendum.\par Jose Luis Hanna M.D.\par Pathologist\par \par The pathology results are concordant with the imaging findings.\par \par The patient has been notified of these results by telephone by Dr. Ponce on 6/18/2020 at 11:46 AM. She has been advised to contact your office and to arrange for SURGICAL AND ONCOLOGIC CONSULTATION AND MANAGEMENT.\par \par Surgical consultation of the left breast(s) is recommended. _\par \par Electronic Signature: I personally reviewed the images and agree with this report. Final Report: Dictated by and Signed by Attending Ciarra Ponce MD 6/18/2020 11:46 AM\par *******END OF ADDENDUM******\par

## 2020-07-09 NOTE — ASSESSMENT
[FreeTextEntry1] : 80 year old female who presents today with her friend for consultation.  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  This is a self palpated finding; she has not had a screening mammogram in more than ten years. She has no prior complaints related to the breasts and no prior history of breast surgery or breast biopsy.  Her family history is significant for her sister with breast cancer at 61, another sister with pancreatic cancer, and her father with stomach cancer.  There is no genetic testing in the patient or her family.  \par \par On physical exam, in the left breast there is a 3 cm palpable mass located in the upper outer quadrant with associated hematoma and nipple inversion.  There are no discrete masses in the right breast or bilateral axillae.  There is no right nipple inversion or discharge bilaterally.  There are no skin changes bilaterally.  We had a lengthy discussion regarding her diagnosis and treatment options.  Her pathology results were reviewed.  She is not a candidate for breast conserving therapy.  She agrees to left breast simple mastectomy and sentinel lymph node mapping and biopsy, with possible axillary node dissection.   [place] lymph node biopsy is done by injecting the periareolar breast tissue with a radioactive tracer one day prior to her surgery and removing the first few lymph nodes that drain the breast.  She declines the option for immediate breast reconstruction.  \par \par The benefits and risks of the mastectomy procedure were explained to the patient, including but not limited to bleeding, infection, seroma and/or hematoma formation, pain, and possible re-operation if the surgical excision demonstrates positive margins.  Lymph node biopsies carry the risk of bleeding, infection, wound healing problems, fluid collections at the operative site, pain, numbness or sensitivity of the skin in that area, swelling of the arm, injury to nerves under the arm that affect muscle of the chest and back, stiffness of the underarm and shoulder. The more lymph nodes that need to be removed, the higher the chance of chronic arm swelling/lymphedema, and the wider the area of numbness and sensitivity may be.\par \par Informed consent was obtained today.  She is aware that she will meet with the pre-surgical department here at the hospital for pre-surgery testing.  She is also aware that she will likely need to meet with her primary care physician, and/or other medical specialists to obtain clearance for surgery, and to contact them appropriately.  \par \par I spent a total of 60 minutes of face to face time with this patient, greater than 50% of which was spent in counseling and/or coordination of care.  All of her questions were appropriately answered. \par

## 2020-07-09 NOTE — HISTORY OF PRESENT ILLNESS
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 \par Self palpated finding; her last screening mammogram was more than ten years ago.  Patient complains of left breast mass with left nipple inversion. \par Her family history is significant for her sister with breast cancer at 61, her father with stomach cancer at 49, her other sister with pancreatic cancer at 75.  No genetic testing in the patient or her family. \par \par SHERI L-Dex Score: -0.6\par Date: 7/2/20

## 2020-07-09 NOTE — PAST MEDICAL HISTORY
[Postmenopausal] : The patient is postmenopausal [Menarche Age ____] : age at menarche was [unfilled] [Menopause Age____] : age at menopause was [unfilled] [Total Preg ___] : G[unfilled] [History of Hormone Replacement Treatment] : has no history of hormone replacement treatment [FreeTextEntry5] : D&C [FreeTextEntry6] : none [FreeTextEntry7] : none [FreeTextEntry8] : none

## 2020-07-09 NOTE — REASON FOR VISIT
[Consultation] : a consultation visit [Friend] : friend [FreeTextEntry1] : Left breast cancer, self palpated; last screening mammogram was more than ten years ago.

## 2020-07-09 NOTE — REVIEW OF SYSTEMS
[Breast Lump] : breast lump [Nipple Inverted] : inversion of the nipple [Fever] : no fever [Chills] : no chills [Breast Swelling] : no breast swelling [Breast Reddening] : no reddening of the breast [Breast Warmth] : no breast warmth [Breast Itching] : no breast itching [Dimpling Of Skin] : no dimpling of breast skin [Enlargement] : no breast enlargement ['Orange Peel' Appearance] : no 'orange peel' appearance of breast skin [Decreasing In Size] : breast size not decreasing [FreeTextEntry2] : denies recent travel history.   [Nipple Discharge] : no nipple discharge [de-identified] : left breast palpable lump [FreeTextEntry4] : left nipple inversion.

## 2020-07-09 NOTE — PHYSICAL EXAM
[Normocephalic] : normocephalic [Supple] : supple [Atraumatic] : atraumatic [No Supraclavicular Adenopathy] : no supraclavicular adenopathy [No Thyromegaly] : no thyromegaly [No Cervical Adenopathy] : no cervical adenopathy [Symmetrical] : symmetrical [Examined in the supine and seated position] : examined in the supine and seated position [No dominant masses] : no dominant masses in right breast  [No Nipple Retraction] : no right nipple retraction [Breast Nipple Inversion Left] : nipple inverted [No Nipple Discharge] : no left nipple discharge [Breast Mass Right Breast ___cm] : no masses [___cm] : ~M [unfilled] ~Ucm upper outer quadrant mass [No Axillary Lymphadenopathy] : no left axillary lymphadenopathy [Full ROM] : full range of motion [No Swelling] : no swelling [No Edema] : no edema [No Ulceration] : no ulceration [No Rashes] : no rashes [Breast Nipple Fissures] : nipples not fissured [Breast Nipple Retraction] : nipples not retracted [Breast Nipple Flattening] : nipples not flattened [Breast Abnormal Lactation (Galactorrhea)] : no galactorrhea [Breast Abnormal Secretion Serous Fluid] : no serous discharge [Breast Abnormal Secretion Bloody Fluid] : no bloody discharge [Breast Abnormal Secretion Opalescent Fluid] : no milky discharge [de-identified] : Left breast 3 cm mass with hematoma with associated nipple inversion.

## 2020-07-09 NOTE — CONSULT LETTER
[Consult Letter:] : I had the pleasure of evaluating your patient, [unfilled]. [Dear  ___] : Dear  [unfilled], [Please see my note below.] : Please see my note below. [Consult Closing:] : Thank you very much for allowing me to participate in the care of this patient.  If you have any questions, please do not hesitate to contact me. [FreeTextEntry2] : Nidhi Stafford M.D.\par [address]\[place] [address]  [FreeTextEntry3] : Desiree Banerjee M.D., F.A.C.S.  [Sincerely,] : Sincerely,

## 2020-07-21 ENCOUNTER — LABORATORY RESULT (OUTPATIENT)
Age: 80
End: 2020-07-21

## 2020-07-29 ENCOUNTER — OUTPATIENT (OUTPATIENT)
Dept: OUTPATIENT SERVICES | Facility: HOSPITAL | Age: 80
LOS: 1 days | Discharge: HOME | End: 2020-07-29
Payer: MEDICARE

## 2020-07-29 VITALS
OXYGEN SATURATION: 98 % | SYSTOLIC BLOOD PRESSURE: 146 MMHG | TEMPERATURE: 98 F | DIASTOLIC BLOOD PRESSURE: 86 MMHG | WEIGHT: 128.09 LBS | HEIGHT: 67 IN | HEART RATE: 78 BPM | RESPIRATION RATE: 16 BRPM

## 2020-07-29 DIAGNOSIS — C50.412 MALIGNANT NEOPLASM OF UPPER-OUTER QUADRANT OF LEFT FEMALE BREAST: ICD-10-CM

## 2020-07-29 DIAGNOSIS — Z01.818 ENCOUNTER FOR OTHER PREPROCEDURAL EXAMINATION: ICD-10-CM

## 2020-07-29 DIAGNOSIS — Z98.890 OTHER SPECIFIED POSTPROCEDURAL STATES: Chronic | ICD-10-CM

## 2020-07-29 LAB
ALBUMIN SERPL ELPH-MCNC: 4.9 G/DL — SIGNIFICANT CHANGE UP (ref 3.5–5.2)
ALP SERPL-CCNC: 169 U/L — HIGH (ref 30–115)
ALT FLD-CCNC: 10 U/L — SIGNIFICANT CHANGE UP (ref 0–41)
ANION GAP SERPL CALC-SCNC: 13 MMOL/L — SIGNIFICANT CHANGE UP (ref 7–14)
APTT BLD: 45.7 SEC — HIGH (ref 27–39.2)
AST SERPL-CCNC: 23 U/L — SIGNIFICANT CHANGE UP (ref 0–41)
BASOPHILS # BLD AUTO: 0.09 K/UL — SIGNIFICANT CHANGE UP (ref 0–0.2)
BASOPHILS NFR BLD AUTO: 1.1 % — HIGH (ref 0–1)
BILIRUB SERPL-MCNC: 1.2 MG/DL — SIGNIFICANT CHANGE UP (ref 0.2–1.2)
BUN SERPL-MCNC: 27 MG/DL — HIGH (ref 10–20)
CALCIUM SERPL-MCNC: 10.2 MG/DL — HIGH (ref 8.5–10.1)
CHLORIDE SERPL-SCNC: 100 MMOL/L — SIGNIFICANT CHANGE UP (ref 98–110)
CO2 SERPL-SCNC: 23 MMOL/L — SIGNIFICANT CHANGE UP (ref 17–32)
CREAT SERPL-MCNC: 1.4 MG/DL — SIGNIFICANT CHANGE UP (ref 0.7–1.5)
EOSINOPHIL # BLD AUTO: 0.25 K/UL — SIGNIFICANT CHANGE UP (ref 0–0.7)
EOSINOPHIL NFR BLD AUTO: 3.1 % — SIGNIFICANT CHANGE UP (ref 0–8)
GLUCOSE SERPL-MCNC: 91 MG/DL — SIGNIFICANT CHANGE UP (ref 70–99)
HCT VFR BLD CALC: 36.5 % — LOW (ref 37–47)
HGB BLD-MCNC: 11 G/DL — LOW (ref 12–16)
IMM GRANULOCYTES NFR BLD AUTO: 0.4 % — HIGH (ref 0.1–0.3)
INR BLD: 3.55 RATIO — HIGH (ref 0.65–1.3)
LYMPHOCYTES # BLD AUTO: 1.35 K/UL — SIGNIFICANT CHANGE UP (ref 1.2–3.4)
LYMPHOCYTES # BLD AUTO: 16.8 % — LOW (ref 20.5–51.1)
MCHC RBC-ENTMCNC: 27.2 PG — SIGNIFICANT CHANGE UP (ref 27–31)
MCHC RBC-ENTMCNC: 30.1 G/DL — LOW (ref 32–37)
MCV RBC AUTO: 90.3 FL — SIGNIFICANT CHANGE UP (ref 81–99)
MONOCYTES # BLD AUTO: 0.8 K/UL — HIGH (ref 0.1–0.6)
MONOCYTES NFR BLD AUTO: 10 % — HIGH (ref 1.7–9.3)
NEUTROPHILS # BLD AUTO: 5.51 K/UL — SIGNIFICANT CHANGE UP (ref 1.4–6.5)
NEUTROPHILS NFR BLD AUTO: 68.6 % — SIGNIFICANT CHANGE UP (ref 42.2–75.2)
NRBC # BLD: 0 /100 WBCS — SIGNIFICANT CHANGE UP (ref 0–0)
PLATELET # BLD AUTO: 268 K/UL — SIGNIFICANT CHANGE UP (ref 130–400)
POTASSIUM SERPL-MCNC: 6.2 MMOL/L — CRITICAL HIGH (ref 3.5–5)
POTASSIUM SERPL-SCNC: 6.2 MMOL/L — CRITICAL HIGH (ref 3.5–5)
PROT SERPL-MCNC: 7.2 G/DL — SIGNIFICANT CHANGE UP (ref 6–8)
PROTHROM AB SERPL-ACNC: >40 SEC — HIGH (ref 9.95–12.87)
RBC # BLD: 4.04 M/UL — LOW (ref 4.2–5.4)
RBC # FLD: 24.3 % — HIGH (ref 11.5–14.5)
SODIUM SERPL-SCNC: 136 MMOL/L — SIGNIFICANT CHANGE UP (ref 135–146)
WBC # BLD: 8.03 K/UL — SIGNIFICANT CHANGE UP (ref 4.8–10.8)
WBC # FLD AUTO: 8.03 K/UL — SIGNIFICANT CHANGE UP (ref 4.8–10.8)

## 2020-07-29 PROCEDURE — 93010 ELECTROCARDIOGRAM REPORT: CPT

## 2020-07-29 RX ORDER — METOPROLOL TARTRATE 50 MG
0 TABLET ORAL
Qty: 0 | Refills: 0 | DISCHARGE

## 2020-07-29 RX ORDER — FUROSEMIDE 40 MG
1 TABLET ORAL
Qty: 0 | Refills: 0 | DISCHARGE

## 2020-07-29 NOTE — H&P PST ADULT - NSICDXPASTMEDICALHX_GEN_ALL_CORE_FT
PAST MEDICAL HISTORY:  Afib     Anemia PRBC -3 transfusion- MAY 2020    Breast cancer LEFT    High cholesterol     HTN (hypertension)     PUD (peptic ulcer disease)

## 2020-07-29 NOTE — H&P PST ADULT - HISTORY OF PRESENT ILLNESS
80yr old female states was recently diagnosed with LEFT BREAST CANCER -presents to pretesting for LEFT BREAST SIMPLE MASTECTOMY, sentinel node mapping and biopsy, possible axillary node dissection. Denies sick contacts. Denies COVID S/S. Exercise danielle 3-4 flat blocks.

## 2020-07-30 ENCOUNTER — EMERGENCY (EMERGENCY)
Facility: HOSPITAL | Age: 80
LOS: 0 days | Discharge: HOME | End: 2020-07-30
Attending: EMERGENCY MEDICINE | Admitting: EMERGENCY MEDICINE
Payer: MEDICARE

## 2020-07-30 VITALS
OXYGEN SATURATION: 99 % | SYSTOLIC BLOOD PRESSURE: 118 MMHG | TEMPERATURE: 98 F | DIASTOLIC BLOOD PRESSURE: 60 MMHG | HEART RATE: 63 BPM | RESPIRATION RATE: 18 BRPM

## 2020-07-30 VITALS — HEIGHT: 67 IN | WEIGHT: 130.07 LBS

## 2020-07-30 DIAGNOSIS — Z86.2 PERSONAL HISTORY OF DISEASES OF THE BLOOD AND BLOOD-FORMING ORGANS AND CERTAIN DISORDERS INVOLVING THE IMMUNE MECHANISM: ICD-10-CM

## 2020-07-30 DIAGNOSIS — I10 ESSENTIAL (PRIMARY) HYPERTENSION: ICD-10-CM

## 2020-07-30 DIAGNOSIS — Z98.890 OTHER SPECIFIED POSTPROCEDURAL STATES: Chronic | ICD-10-CM

## 2020-07-30 DIAGNOSIS — Z79.891 LONG TERM (CURRENT) USE OF OPIATE ANALGESIC: ICD-10-CM

## 2020-07-30 DIAGNOSIS — Z79.899 OTHER LONG TERM (CURRENT) DRUG THERAPY: ICD-10-CM

## 2020-07-30 DIAGNOSIS — R79.89 OTHER SPECIFIED ABNORMAL FINDINGS OF BLOOD CHEMISTRY: ICD-10-CM

## 2020-07-30 DIAGNOSIS — I48.91 UNSPECIFIED ATRIAL FIBRILLATION: ICD-10-CM

## 2020-07-30 DIAGNOSIS — E78.00 PURE HYPERCHOLESTEROLEMIA, UNSPECIFIED: ICD-10-CM

## 2020-07-30 DIAGNOSIS — Z79.01 LONG TERM (CURRENT) USE OF ANTICOAGULANTS: ICD-10-CM

## 2020-07-30 DIAGNOSIS — Z85.3 PERSONAL HISTORY OF MALIGNANT NEOPLASM OF BREAST: ICD-10-CM

## 2020-07-30 DIAGNOSIS — E87.5 HYPERKALEMIA: ICD-10-CM

## 2020-07-30 PROBLEM — D64.9 ANEMIA, UNSPECIFIED: Chronic | Status: ACTIVE | Noted: 2020-07-29

## 2020-07-30 PROBLEM — C50.919 MALIGNANT NEOPLASM OF UNSPECIFIED SITE OF UNSPECIFIED FEMALE BREAST: Chronic | Status: ACTIVE | Noted: 2020-07-29

## 2020-07-30 LAB
ANION GAP SERPL CALC-SCNC: 10 MMOL/L — SIGNIFICANT CHANGE UP (ref 7–14)
BASE EXCESS BLDV CALC-SCNC: -2.2 MMOL/L — LOW (ref -2–2)
BUN SERPL-MCNC: 31 MG/DL — HIGH (ref 10–20)
CA-I SERPL-SCNC: 1.3 MMOL/L — SIGNIFICANT CHANGE UP (ref 1.12–1.3)
CALCIUM SERPL-MCNC: 10.3 MG/DL — HIGH (ref 8.5–10.1)
CHLORIDE SERPL-SCNC: 101 MMOL/L — SIGNIFICANT CHANGE UP (ref 98–110)
CO2 SERPL-SCNC: 24 MMOL/L — SIGNIFICANT CHANGE UP (ref 17–32)
CREAT SERPL-MCNC: 1.4 MG/DL — SIGNIFICANT CHANGE UP (ref 0.7–1.5)
GAS PNL BLDV: 137 MMOL/L — SIGNIFICANT CHANGE UP (ref 136–145)
GAS PNL BLDV: SIGNIFICANT CHANGE UP
GLUCOSE SERPL-MCNC: 94 MG/DL — SIGNIFICANT CHANGE UP (ref 70–99)
HCO3 BLDV-SCNC: 25 MMOL/L — SIGNIFICANT CHANGE UP (ref 22–29)
HCT VFR BLDA CALC: 38.6 % — SIGNIFICANT CHANGE UP (ref 34–44)
HGB BLD CALC-MCNC: 12.6 G/DL — LOW (ref 14–18)
HOROWITZ INDEX BLDV+IHG-RTO: 21 — SIGNIFICANT CHANGE UP
LACTATE BLDV-MCNC: 0.9 MMOL/L — SIGNIFICANT CHANGE UP (ref 0.5–1.6)
PCO2 BLDV: 51 MMHG — SIGNIFICANT CHANGE UP (ref 41–51)
PH BLDV: 7.3 — SIGNIFICANT CHANGE UP (ref 7.26–7.43)
PO2 BLDV: 19 MMHG — LOW (ref 20–40)
POTASSIUM BLDV-SCNC: 4.3 MMOL/L — SIGNIFICANT CHANGE UP (ref 3.3–5.6)
POTASSIUM SERPL-MCNC: 4.6 MMOL/L — SIGNIFICANT CHANGE UP (ref 3.5–5)
POTASSIUM SERPL-SCNC: 4.6 MMOL/L — SIGNIFICANT CHANGE UP (ref 3.5–5)
SAO2 % BLDV: 23 % — SIGNIFICANT CHANGE UP
SODIUM SERPL-SCNC: 135 MMOL/L — SIGNIFICANT CHANGE UP (ref 135–146)

## 2020-07-30 PROCEDURE — 99284 EMERGENCY DEPT VISIT MOD MDM: CPT

## 2020-07-30 NOTE — ED PROVIDER NOTE - OBJECTIVE STATEMENT
81 y/o female present to the ed with elevated potassium during pre admission testing. patient denies any cp, sob, palpitations. no change in medications or diet. patient denies any fever,chills. no back pain, leg swelling

## 2020-07-30 NOTE — ED ADULT NURSE NOTE - PMH
Afib    Anemia  PRBC -3 transfusion- MAY 2020  Breast cancer  LEFT  High cholesterol    HTN (hypertension)    PUD (peptic ulcer disease)

## 2020-07-30 NOTE — ED PROVIDER NOTE - CARE PROVIDER_API CALL
Nidhi Stafford  INTERNAL MEDICINE  [address] [place] [address]  Phone: [phone]  Fax: [phone]  Follow Up Time:

## 2020-07-30 NOTE — ED ADULT TRIAGE NOTE - CHIEF COMPLAINT QUOTE
pt states she had pretesting for Mastectomy scheduled on Weds and was told that her potassium was elevated.

## 2020-07-30 NOTE — ED PROVIDER NOTE - PHYSICAL EXAMINATION
Vital Signs: I have reviewed the initial vital signs.  Constitutional: well-nourished, appears stated age,  Head: atraumatic and normocephalic  Eyes:PERRLA, EOMI, clear conjunctiva  Cardiovascular: regular rate, regular rhythm, well-perfused extremities  Respiratory: unlabored respiratory effort, clear to auscultation bilaterally  Neuro: awake, alert, follows commands, oriented, no focal deficits, GCS 15  ;

## 2020-07-30 NOTE — ED PROVIDER NOTE - PATIENT PORTAL LINK FT
You can access the FollowMyHealth Patient Portal offered by [facility] by registering at the following website: [email or web address]. By joining [facility]’s FollowMyHealth portal, you will also be able to view your health information using other applications (apps) compatible with our system.

## 2020-08-02 ENCOUNTER — LABORATORY RESULT (OUTPATIENT)
Age: 80
End: 2020-08-02

## 2020-08-02 ENCOUNTER — OUTPATIENT (OUTPATIENT)
Dept: OUTPATIENT SERVICES | Facility: HOSPITAL | Age: 80
LOS: 1 days | Discharge: HOME | End: 2020-08-02

## 2020-08-02 DIAGNOSIS — Z11.59 ENCOUNTER FOR SCREENING FOR OTHER VIRAL DISEASES: ICD-10-CM

## 2020-08-02 DIAGNOSIS — Z98.890 OTHER SPECIFIED POSTPROCEDURAL STATES: Chronic | ICD-10-CM

## 2020-08-04 ENCOUNTER — APPOINTMENT (OUTPATIENT)
Dept: CARDIOLOGY | Facility: CLINIC | Age: 80
End: 2020-08-04
Payer: MEDICARE

## 2020-08-04 ENCOUNTER — RESULT REVIEW (OUTPATIENT)
Age: 80
End: 2020-08-04

## 2020-08-04 ENCOUNTER — OUTPATIENT (OUTPATIENT)
Dept: OUTPATIENT SERVICES | Facility: HOSPITAL | Age: 80
LOS: 1 days | Discharge: HOME | End: 2020-08-04
Payer: MEDICARE

## 2020-08-04 VITALS
BODY MASS INDEX: 23.23 KG/M2 | HEART RATE: 85 BPM | DIASTOLIC BLOOD PRESSURE: 70 MMHG | WEIGHT: 127 LBS | SYSTOLIC BLOOD PRESSURE: 106 MMHG | TEMPERATURE: 97.2 F

## 2020-08-04 DIAGNOSIS — Z98.890 OTHER SPECIFIED POSTPROCEDURAL STATES: Chronic | ICD-10-CM

## 2020-08-04 DIAGNOSIS — Z01.810 ENCOUNTER FOR PREPROCEDURAL CARDIOVASCULAR EXAMINATION: ICD-10-CM

## 2020-08-04 DIAGNOSIS — C50.919 MALIGNANT NEOPLASM OF UNSPECIFIED SITE OF UNSPECIFIED FEMALE BREAST: ICD-10-CM

## 2020-08-04 PROCEDURE — 99214 OFFICE O/P EST MOD 30 MIN: CPT

## 2020-08-04 PROCEDURE — 78195 LYMPH SYSTEM IMAGING: CPT | Mod: 26

## 2020-08-04 PROCEDURE — 93000 ELECTROCARDIOGRAM COMPLETE: CPT

## 2020-08-04 NOTE — HISTORY OF PRESENT ILLNESS
[FreeTextEntry1] : 79 yo F with h/o Persistent AF on Xarelto, HTN presents for follow up and risk stratification prior to left mastectomy and LND.  Recent acute severe anemia requiring PRBC transfusion in June, EGD and colonoscopy both unremarkable, taking iron supplements.  Remains asymptomatic from a cardiac standpoint.  METs >4.\par \par Teacher at [employer].\par \par EKG (8/4/2020):  AF 85 bpm, no ST-T changes\par Echo (6/12/2020):  EF 50-55%, G2DD, Mod-severe MR, Mild AI\par \par

## 2020-08-04 NOTE — PHYSICAL EXAM
[General Appearance - Well Developed] : well developed [Eyelids - No Xanthelasma] : the eyelids demonstrated no xanthelasmas [General Appearance - In No Acute Distress] : no acute distress [Normal Conjunctiva] : the conjunctiva exhibited no abnormalities [Respiration, Rhythm And Depth] : normal respiratory rhythm and effort [Exaggerated Use Of Accessory Muscles For Inspiration] : no accessory muscle use [Auscultation Breath Sounds / Voice Sounds] : lungs were clear to auscultation bilaterally [Abdomen Soft] : soft [Abdomen Tenderness] : non-tender [Abdomen Mass (___ Cm)] : no abdominal mass palpated [Cyanosis, Localized] : no localized cyanosis [Nail Clubbing] : no clubbing of the fingernails [No Skin Ulcers] : no skin ulcer [] : no rash [Skin Color & Pigmentation] : normal skin color and pigmentation [Oriented To Time, Place, And Person] : oriented to person, place, and time [FreeTextEntry1] : no JVD

## 2020-08-04 NOTE — DISCUSSION/SUMMARY
[FreeTextEntry1] : Moderate-risk for intermediate-risk surgery\par May hold Xarelto and resume when cleared by surgery team\par Continue Metoprolol 100 mg BID\par Avoid fluid overload

## 2020-08-05 ENCOUNTER — RESULT REVIEW (OUTPATIENT)
Age: 80
End: 2020-08-05

## 2020-08-05 ENCOUNTER — APPOINTMENT (OUTPATIENT)
Dept: BREAST CENTER | Facility: AMBULATORY SURGERY CENTER | Age: 80
End: 2020-08-05
Payer: MEDICARE

## 2020-08-05 ENCOUNTER — LABORATORY RESULT (OUTPATIENT)
Age: 80
End: 2020-08-05

## 2020-08-05 ENCOUNTER — OUTPATIENT (OUTPATIENT)
Dept: OUTPATIENT SERVICES | Facility: HOSPITAL | Age: 80
LOS: 1 days | Discharge: HOME | End: 2020-08-05
Payer: MEDICARE

## 2020-08-05 VITALS
DIASTOLIC BLOOD PRESSURE: 70 MMHG | HEIGHT: 67 IN | OXYGEN SATURATION: 99 % | TEMPERATURE: 98 F | HEART RATE: 75 BPM | WEIGHT: 128.09 LBS | RESPIRATION RATE: 18 BRPM | SYSTOLIC BLOOD PRESSURE: 132 MMHG

## 2020-08-05 VITALS
DIASTOLIC BLOOD PRESSURE: 56 MMHG | OXYGEN SATURATION: 100 % | SYSTOLIC BLOOD PRESSURE: 120 MMHG | RESPIRATION RATE: 13 BRPM | HEART RATE: 71 BPM

## 2020-08-05 DIAGNOSIS — Z98.890 OTHER SPECIFIED POSTPROCEDURAL STATES: Chronic | ICD-10-CM

## 2020-08-05 LAB
POCT INR: 1.1 RATIO — SIGNIFICANT CHANGE UP (ref 0.9–1.2)
POCT PT: 13.4 SEC — SIGNIFICANT CHANGE UP (ref 10–13.4)

## 2020-08-05 PROCEDURE — 19303 MAST SIMPLE COMPLETE: CPT | Mod: LT

## 2020-08-05 PROCEDURE — 88341 IMHCHEM/IMCYTCHM EA ADD ANTB: CPT | Mod: 26

## 2020-08-05 PROCEDURE — 38900 IO MAP OF SENT LYMPH NODE: CPT | Mod: LT

## 2020-08-05 PROCEDURE — 38525 BIOPSY/REMOVAL LYMPH NODES: CPT | Mod: 59,LT

## 2020-08-05 PROCEDURE — 88307 TISSUE EXAM BY PATHOLOGIST: CPT | Mod: 26

## 2020-08-05 PROCEDURE — 88342 IMHCHEM/IMCYTCHM 1ST ANTB: CPT | Mod: 26

## 2020-08-05 PROCEDURE — 38745 REMOVE ARMPIT LYMPH NODES: CPT | Mod: 59,LT

## 2020-08-05 RX ORDER — DEXAMETHASONE 0.5 MG/5ML
4 ELIXIR ORAL ONCE
Refills: 0 | Status: DISCONTINUED | OUTPATIENT
Start: 2020-08-05 | End: 2020-08-20

## 2020-08-05 RX ORDER — METOPROLOL TARTRATE 50 MG
0 TABLET ORAL
Qty: 0 | Refills: 0 | DISCHARGE

## 2020-08-05 RX ORDER — LOSARTAN POTASSIUM 100 MG/1
1 TABLET, FILM COATED ORAL
Qty: 0 | Refills: 0 | DISCHARGE

## 2020-08-05 RX ORDER — HYDROMORPHONE HYDROCHLORIDE 2 MG/ML
0.5 INJECTION INTRAMUSCULAR; INTRAVENOUS; SUBCUTANEOUS
Refills: 0 | Status: DISCONTINUED | OUTPATIENT
Start: 2020-08-05 | End: 2020-08-05

## 2020-08-05 RX ORDER — RIVAROXABAN 15 MG-20MG
1 KIT ORAL
Qty: 0 | Refills: 0 | DISCHARGE

## 2020-08-05 RX ORDER — IRON,CARBONYL 45 MG
1 TABLET ORAL
Qty: 0 | Refills: 0 | DISCHARGE

## 2020-08-05 RX ORDER — OXYCODONE HYDROCHLORIDE 5 MG/1
5 TABLET ORAL ONCE
Refills: 0 | Status: DISCONTINUED | OUTPATIENT
Start: 2020-08-05 | End: 2020-08-05

## 2020-08-05 RX ORDER — OMEPRAZOLE 10 MG/1
1 CAPSULE, DELAYED RELEASE ORAL
Qty: 0 | Refills: 0 | DISCHARGE

## 2020-08-05 RX ORDER — ATORVASTATIN CALCIUM 80 MG/1
1 TABLET, FILM COATED ORAL
Qty: 0 | Refills: 0 | DISCHARGE

## 2020-08-05 RX ORDER — SODIUM CHLORIDE 9 MG/ML
1000 INJECTION, SOLUTION INTRAVENOUS
Refills: 0 | Status: DISCONTINUED | OUTPATIENT
Start: 2020-08-05 | End: 2020-08-20

## 2020-08-05 RX ORDER — OMEGA-3 ACID ETHYL ESTERS 1 G
0 CAPSULE ORAL
Qty: 0 | Refills: 0 | DISCHARGE

## 2020-08-05 RX ADMIN — OXYCODONE HYDROCHLORIDE 5 MILLIGRAM(S): 5 TABLET ORAL at 12:41

## 2020-08-05 RX ADMIN — SODIUM CHLORIDE 100 MILLILITER(S): 9 INJECTION, SOLUTION INTRAVENOUS at 11:23

## 2020-08-05 NOTE — ASU DISCHARGE PLAN (ADULT/PEDIATRIC) - ASU DC SPECIAL INSTRUCTIONSFT
Please keep dressing and drains dry.      [facility] will provide drain and wound care.    Please start antibiotics today and take until completed.  Take pain medication as needed.

## 2020-08-05 NOTE — ASU PREOP CHECKLIST - PATIENT'S PERSONAL PROPERTY REMOVED
August 22, 2017      Silvio Mckeon MD  [address]  [place] [address]           [place] [facility]  [address], [address]  [address] [facility]  [place] [address]  Phone: [phone]          Patient: Mari Ambrocio   MR Number: [id number]   YOB: 1960   Date of Visit: 8/22/2017       Dear Dr. Silvio Mckeon:    Thank you for referring Mari Ambrocio to me for evaluation. Attached you will find relevant portions of my assessment and plan of care.    If you have questions, please do not hesitate to call me. I look forward to following Mari Ambrocio along with you.    Sincerely,    Monique Baker MD    Enclosure  CC:  No Recipients    If you would like to receive this communication electronically, please contact [email or web address] or [phone] to request more information on [facility] access.    For providers and/or their staff who would like to refer a patient to [facility], please contact us through our one-stop-shop provider referral line, [facility], at [phone].    If you feel you have received this communication in error or would no longer like to receive these types of communications, please e-mail [email or web address]          dentures

## 2020-08-08 ENCOUNTER — EMERGENCY (EMERGENCY)
Facility: HOSPITAL | Age: 80
LOS: 0 days | Discharge: HOME | End: 2020-08-09
Attending: EMERGENCY MEDICINE | Admitting: EMERGENCY MEDICINE
Payer: MEDICARE

## 2020-08-08 VITALS
DIASTOLIC BLOOD PRESSURE: 89 MMHG | HEART RATE: 120 BPM | TEMPERATURE: 98 F | WEIGHT: 128.97 LBS | HEIGHT: 67 IN | SYSTOLIC BLOOD PRESSURE: 123 MMHG | RESPIRATION RATE: 20 BRPM | OXYGEN SATURATION: 99 %

## 2020-08-08 DIAGNOSIS — I10 ESSENTIAL (PRIMARY) HYPERTENSION: ICD-10-CM

## 2020-08-08 DIAGNOSIS — T81.89XA OTHER COMPLICATIONS OF PROCEDURES, NOT ELSEWHERE CLASSIFIED, INITIAL ENCOUNTER: ICD-10-CM

## 2020-08-08 DIAGNOSIS — Z20.828 CONTACT WITH AND (SUSPECTED) EXPOSURE TO OTHER VIRAL COMMUNICABLE DISEASES: ICD-10-CM

## 2020-08-08 DIAGNOSIS — Z98.890 OTHER SPECIFIED POSTPROCEDURAL STATES: Chronic | ICD-10-CM

## 2020-08-08 DIAGNOSIS — Z79.899 OTHER LONG TERM (CURRENT) DRUG THERAPY: ICD-10-CM
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
## 2020-08-08 DIAGNOSIS — E78.00 PURE HYPERCHOLESTEROLEMIA, UNSPECIFIED: ICD-10-CM

## 2020-08-08 LAB
ALBUMIN SERPL ELPH-MCNC: 4 G/DL — SIGNIFICANT CHANGE UP (ref 3.5–5.2)
ALP SERPL-CCNC: 142 U/L — HIGH (ref 30–115)
ALT FLD-CCNC: 8 U/L — SIGNIFICANT CHANGE UP (ref 0–41)
ANION GAP SERPL CALC-SCNC: 13 MMOL/L — SIGNIFICANT CHANGE UP (ref 7–14)
APTT BLD: 38 SEC — SIGNIFICANT CHANGE UP (ref 27–39.2)
AST SERPL-CCNC: 21 U/L — SIGNIFICANT CHANGE UP (ref 0–41)
BASOPHILS # BLD AUTO: 0.07 K/UL — SIGNIFICANT CHANGE UP (ref 0–0.2)
BASOPHILS NFR BLD AUTO: 0.7 % — SIGNIFICANT CHANGE UP (ref 0–1)
BILIRUB SERPL-MCNC: 0.9 MG/DL — SIGNIFICANT CHANGE UP (ref 0.2–1.2)
BLD GP AB SCN SERPL QL: SIGNIFICANT CHANGE UP
BUN SERPL-MCNC: 25 MG/DL — HIGH (ref 10–20)
CALCIUM SERPL-MCNC: 9.1 MG/DL — SIGNIFICANT CHANGE UP (ref 8.5–10.1)
CHLORIDE SERPL-SCNC: 97 MMOL/L — LOW (ref 98–110)
CO2 SERPL-SCNC: 18 MMOL/L — SIGNIFICANT CHANGE UP (ref 17–32)
CREAT SERPL-MCNC: 1.2 MG/DL — SIGNIFICANT CHANGE UP (ref 0.7–1.5)
EOSINOPHIL # BLD AUTO: 0.38 K/UL — SIGNIFICANT CHANGE UP (ref 0–0.7)
EOSINOPHIL NFR BLD AUTO: 3.8 % — SIGNIFICANT CHANGE UP (ref 0–8)
GLUCOSE SERPL-MCNC: 118 MG/DL — HIGH (ref 70–99)
HCT VFR BLD CALC: 32.3 % — LOW (ref 37–47)
HGB BLD-MCNC: 10.1 G/DL — LOW (ref 12–16)
IMM GRANULOCYTES NFR BLD AUTO: 0.8 % — HIGH (ref 0.1–0.3)
INR BLD: 2.58 RATIO — HIGH (ref 0.65–1.3)
LYMPHOCYTES # BLD AUTO: 1.31 K/UL — SIGNIFICANT CHANGE UP (ref 1.2–3.4)
LYMPHOCYTES # BLD AUTO: 12.9 % — LOW (ref 20.5–51.1)
MCHC RBC-ENTMCNC: 28.7 PG — SIGNIFICANT CHANGE UP (ref 27–31)
MCHC RBC-ENTMCNC: 31.3 G/DL — LOW (ref 32–37)
MCV RBC AUTO: 91.8 FL — SIGNIFICANT CHANGE UP (ref 81–99)
MONOCYTES # BLD AUTO: 1.08 K/UL — HIGH (ref 0.1–0.6)
MONOCYTES NFR BLD AUTO: 10.7 % — HIGH (ref 1.7–9.3)
NEUTROPHILS # BLD AUTO: 7.21 K/UL — HIGH (ref 1.4–6.5)
NEUTROPHILS NFR BLD AUTO: 71.1 % — SIGNIFICANT CHANGE UP (ref 42.2–75.2)
NRBC # BLD: 0 /100 WBCS — SIGNIFICANT CHANGE UP (ref 0–0)
PLATELET # BLD AUTO: 225 K/UL — SIGNIFICANT CHANGE UP (ref 130–400)
POTASSIUM SERPL-MCNC: 5.2 MMOL/L — HIGH (ref 3.5–5)
POTASSIUM SERPL-SCNC: 5.2 MMOL/L — HIGH (ref 3.5–5)
PROT SERPL-MCNC: 6.2 G/DL — SIGNIFICANT CHANGE UP (ref 6–8)
PROTHROM AB SERPL-ACNC: 29.7 SEC — HIGH (ref 9.95–12.87)
RBC # BLD: 3.52 M/UL — LOW (ref 4.2–5.4)
RBC # FLD: 23.4 % — HIGH (ref 11.5–14.5)
SODIUM SERPL-SCNC: 128 MMOL/L — LOW (ref 135–146)
WBC # BLD: 10.13 K/UL — SIGNIFICANT CHANGE UP (ref 4.8–10.8)
WBC # FLD AUTO: 10.13 K/UL — SIGNIFICANT CHANGE UP (ref 4.8–10.8)

## 2020-08-08 PROCEDURE — 93010 ELECTROCARDIOGRAM REPORT: CPT

## 2020-08-08 PROCEDURE — 71045 X-RAY EXAM CHEST 1 VIEW: CPT | Mod: 26

## 2020-08-08 PROCEDURE — 99284 EMERGENCY DEPT VISIT MOD MDM: CPT | Mod: CS,GC

## 2020-08-08 RX ORDER — SODIUM CHLORIDE 9 MG/ML
1000 INJECTION, SOLUTION INTRAVENOUS ONCE
Refills: 0 | Status: COMPLETED | OUTPATIENT
Start: 2020-08-08 | End: 2020-08-08

## 2020-08-08 RX ADMIN — SODIUM CHLORIDE 1000 MILLILITER(S): 9 INJECTION, SOLUTION INTRAVENOUS at 23:07

## 2020-08-08 NOTE — CONSULT NOTE ADULT - SUBJECTIVE AND OBJECTIVE BOX
HPI:  80 year old female with PMHx of afib, HTN, HLD, anemia, breast cancer and PSHx of modified radical mastectomy on 08/05 with no complications. Patient was discharged home with two drains and VNS. VNS saw patient earlier today, did not perform dressing change at that time. Later in the evening patient noticed bloody output on clothing. Patient presented to ED for further evaluation of output. Denies any additional complaints, post operative pain well controlled with Tylenol as needed. On exam two NATASHA drains are in place with approximately 20cc of bloody output with mildly saturated dressings. Dressings were removed, both drains are sutured in place and draining adequately. Dressings were replaced and NATASHA drains emptied. Chest dressings were examined, they are clean, dry and intact. Patient has mild TTP over incision area, it is soft with no evidence of hematoma or swelling.     PAST MEDICAL & SURGICAL HISTORY:  Breast cancer: LEFT  Anemia: PRBC -3 transfusion- MAY 2020  PUD (peptic ulcer disease)  High cholesterol  Afib  HTN (hypertension)  H/O arthroscopy of knee  H/O colonoscopy: MAY 2020    MEDICATIONS  (STANDING):  lactated ringers Bolus 1000 milliLiter(s) IV Bolus once    MEDICATIONS  (PRN):    Allergies: No Known Allergies    REVIEW OF SYSTEMS    [x] A ten-point review of systems was otherwise negative except as noted.  [ ] Due to altered mental status/intubation, subjective information were not able to be obtained from the patient. History was obtained, to the extent possible, from review of the chart and collateral sources of information.      Vital Signs Last 24 Hrs  T(C): 36.4 (08 Aug 2020 20:23), Max: 36.4 (08 Aug 2020 20:23)  T(F): 97.6 (08 Aug 2020 20:23), Max: 97.6 (08 Aug 2020 20:23)  HR: 120 (08 Aug 2020 20:23) (120 - 120)  BP: 123/89 (08 Aug 2020 20:23) (123/89 - 123/89)  BP(mean): --  RR: 20 (08 Aug 2020 20:23) (20 - 20)  SpO2: 99% (08 Aug 2020 20:23) (99% - 99%)    PHYSICAL EXAM:  GENERAL: NAD, well-appearing  CHEST/LUNG: Clear to auscultation bilaterally, chest dressing are clean, dry and intact. No evidence of hematoma or swelling surrounding incision site. Mild TTP over region. Two NATASHA drains are sutured in place with sanguinous output. Overlying dressing was saturated and replaced.  HEART: Regular rate and rhythm  ABDOMEN: Soft, Nontender, Nondistended;   EXTREMITIES:  No clubbing, cyanosis, or edema    LABS: PENDING  RADIOLOGY & ADDITIONAL STUDIES: NONE HPI:  80 year old female with PMHx of afib, HTN, HLD, anemia, breast cancer and PSHx of modified radical mastectomy on 08/05 with no complications. Patient was discharged home with two drains and VNS. VNS saw patient earlier today, did not perform dressing change at that time. Later in the evening patient noticed bloody output on clothing. Patient presented to ED for further evaluation of output. Denies any additional complaints, post operative pain well controlled with Tylenol as needed. On exam two NATASHA drains are in place with approximately 20cc of bloody output with mildly saturated dressings. Dressings were removed, both drains are sutured in place and draining adequately. Dressings were replaced and NATASHA drains emptied. Chest dressings were examined, they are clean, dry and intact. Patient has mild TTP over incision area, it is soft with no evidence of hematoma or swelling. Of note, patient states she restarted Eliquis 1 day post operatively.     PAST MEDICAL & SURGICAL HISTORY:  Breast cancer: LEFT  Anemia: PRBC -3 transfusion- MAY 2020  PUD (peptic ulcer disease)  High cholesterol  Afib  HTN (hypertension)  H/O arthroscopy of knee  H/O colonoscopy: MAY 2020    MEDICATIONS  (STANDING):  lactated ringers Bolus 1000 milliLiter(s) IV Bolus once    MEDICATIONS  (PRN):    Allergies: No Known Allergies    REVIEW OF SYSTEMS    [x] A ten-point review of systems was otherwise negative except as noted.  [ ] Due to altered mental status/intubation, subjective information were not able to be obtained from the patient. History was obtained, to the extent possible, from review of the chart and collateral sources of information.      Vital Signs Last 24 Hrs  T(C): 36.4 (08 Aug 2020 20:23), Max: 36.4 (08 Aug 2020 20:23)  T(F): 97.6 (08 Aug 2020 20:23), Max: 97.6 (08 Aug 2020 20:23)  HR: 120 (08 Aug 2020 20:23) (120 - 120)  BP: 123/89 (08 Aug 2020 20:23) (123/89 - 123/89)  BP(mean): --  RR: 20 (08 Aug 2020 20:23) (20 - 20)  SpO2: 99% (08 Aug 2020 20:23) (99% - 99%)    PHYSICAL EXAM:  GENERAL: NAD, well-appearing  CHEST/LUNG: Clear to auscultation bilaterally, chest dressing are clean, dry and intact. No evidence of hematoma or swelling surrounding incision site. Mild TTP over region. Two NATASHA drains are sutured in place with sanguinous output. Overlying dressing was saturated and replaced.  HEART: Regular rate and rhythm  ABDOMEN: Soft, Nontender, Nondistended;   EXTREMITIES:  No clubbing, cyanosis, or edema    LABS:     CAPILLARY BLOOD GLUCOSE                            10.1   10.13 )-----------( 225      ( 08 Aug 2020 21:50 )             32.3       Auto Neutrophil %: 71.1 % (08-08-20 @ 21:50)  Auto Immature Granulocyte %: 0.8 % (08-08-20 @ 21:50)      RADIOLOGY & ADDITIONAL STUDIES: NONE

## 2020-08-08 NOTE — CONSULT NOTE ADULT - ASSESSMENT
Assessment:   80 year old female presents to ED s/p modified radical mastectomy on 08/05 with no complications postoperatively. Patient was discharged home with two drains and VNS. VNS saw patient earlier today, did not perform dressing change at that time. Later in the evening patient noticed bloody output on clothing. Patient presented to ED for further evaluation of output. Denies any additional complaints, post operative pain well controlled with Tylenol as needed. On exam two NATASHA drains are in place with approximately 20cc of bloody output with mildly saturated dressings. Dressings were removed, both drains are sutured in place and draining adequately. Dressings were replaced and NATASHA drains emptied. Chest dressings were examined, they are clean, dry and intact. Patient has mild TTP over incision area, it is soft with no evidence of hematoma or swelling.     Plan:  - Continue with home VNS for dressing changes/drain care  - Continue Tylenol as needed for pain control  - Follow up with Dr. Banerjee in office for scheduled appointment on 8/13 Assessment:   80 year old female presents to ED s/p modified radical mastectomy on 08/05 with no complications postoperatively. Patient was discharged home with two drains and VNS. VNS saw patient earlier today, did not perform dressing change at that time. Later in the evening patient noticed bloody output on clothing. Patient presented to ED for further evaluation of output. Denies any additional complaints, post operative pain well controlled with Tylenol as needed. On exam two NATASHA drains are in place with approximately 20cc of bloody output with mildly saturated dressings. Dressings were removed, both drains are sutured in place and draining adequately. Dressings were replaced and NATASHA drains emptied. Chest dressings were examined, they are clean, dry and intact. Patient has mild TTP over incision area, it is soft with no evidence of hematoma or swelling. Hgb 10.1 from 11.0 preop.     Plan:  - Continue with home VNS for dressing changes/drain care  - Continue Tylenol as needed for pain control  - Follow up with Dr. Banerjee in office for scheduled appointment on 8/13 Assessment:   80 year old female presents to ED s/p modified radical mastectomy on 08/05 with no complications postoperatively. Patient was discharged home with two drains and VNS. VNS saw patient earlier today, did not perform dressing change at that time. Later in the evening patient noticed bloody output on clothing. Patient presented to ED for further evaluation of output. Denies any additional complaints, post operative pain well controlled with Tylenol as needed. On exam two NATASHA drains are in place with approximately 20cc of bloody output with mildly saturated dressings. Dressings were removed, both drains are sutured in place and draining adequately. Dressings were replaced and NATASHA drains emptied. Chest dressings were examined, they are clean, dry and intact. Patient has mild TTP over incision area, it is soft with no evidence of hematoma or swelling. Hgb 10.1 from 11.0 preop.     Plan:  - Continue with home VNS for dressing changes/drain care  - Continue Tylenol as needed for pain control  - Follow up with Dr. Banerjee in office for scheduled appointment on 8/13  - Hold Xarelto until monday morning     Senior Resident Note  Pt seen and examined  Above note has been reviewed and edited  Plan d/w patient and Dr. Chriss Melendrez

## 2020-08-08 NOTE — ED ADULT NURSE NOTE - OBJECTIVE STATEMENT
Pt is 80 yr old female a&ox4 presenting to the ED s/p L breast mastectomy two days ago by Md Griffin. Pt states she was going to the bathroom and when she stood up she noticed blood on her clothing. PMH HTN high cholesterol PUD, a fib on Xarelto. NKDA. L vivian drain drsg mild serosanguinous drainage noted. Pt denies fever, chills, abd pain, sob, hematuria and bloody bms.

## 2020-08-09 VITALS
RESPIRATION RATE: 18 BRPM | DIASTOLIC BLOOD PRESSURE: 62 MMHG | HEART RATE: 80 BPM | SYSTOLIC BLOOD PRESSURE: 146 MMHG | TEMPERATURE: 98 F

## 2020-08-09 RX ORDER — ACETAMINOPHEN 500 MG
650 TABLET ORAL ONCE
Refills: 0 | Status: COMPLETED | OUTPATIENT
Start: 2020-08-09 | End: 2020-08-09

## 2020-08-09 RX ADMIN — Medication 650 MILLIGRAM(S): at 00:56

## 2020-08-09 NOTE — ED PROVIDER NOTE - OBJECTIVE STATEMENT
80 y.o. F with PMH of afib on xarelto radical mastectomy performed by Dr. Banerjee 2 days ago with bleeding from the incision sight. Pt reported having 20 ccs of blood drainaged from the J tube. Denies any nausea/vomititng no CP no SOB weakness.

## 2020-08-09 NOTE — ED PROVIDER NOTE - CLINICAL SUMMARY MEDICAL DECISION MAKING FREE TEXT BOX
s/p L mastectomy on 8/5/20 with c/o bleeding from surgical site, has vivian drains x 2, but noted blood on clothes tonight.  seen and eval by surgery, dressing changed by them, vivian drains in place.  hgb 10, was 11 on pre-op labs.  per surgery, pt to hold xarelto tomorrow.  ok to d/c home, and she will f/u with Dr. Banerjee.  pt told to return to ER for any new/concerning symptoms.  pt understands and agrees with plan.

## 2020-08-09 NOTE — ED PROVIDER NOTE - CARE PROVIDER_API CALL
Desiree Banerjee  SURGERY  [address] [place] [address]  Phone: [phone]  Fax: [phone]  Follow Up Time: 1-3 Days    Nidhi Stafford  INTERNAL MEDICINE  [address] [place] [id number]  Phone: [phone]  Fax: [phone]  Follow Up Time: 1-3 Days

## 2020-08-09 NOTE — ED PROVIDER NOTE - PHYSICAL EXAMINATION
CONSTITUTIONAL: Frail appearing, elderly   SKIN: warm, dry, + bandage along the left breast  HEAD: Normocephalic; atraumatic.  EYES: PERRL, EOMI, no conjunctival erythema  ENT: No nasal discharge; airway clear.  NECK: Supple; non tender.  CARD: S1, S2 normal; no murmurs, gallops, or rubs. Regular rate and rhythm. + Mastectomy incision sight with bandage, dried blood. + 2 J tubes filled with blood 10 ml each.  RESP: No wheezes, rales or rhonchi.  ABD: soft ntnd  EXT: Normal ROM.  No clubbing, cyanosis or edema.   LYMPH: No acute cervical adenopathy.  NEURO: Alert, oriented, grossly unremarkable  PSYCH: Cooperative, appropriate.

## 2020-08-09 NOTE — ED PROVIDER NOTE - CARE PROVIDERS DIRECT ADDRESSES
[email or web address]

## 2020-08-09 NOTE — ED PROVIDER NOTE - ATTENDING CONTRIBUTION TO CARE
79 y/o female with h/o afib on xarelto, htn, hld, pud, recently diagnosed L breast CA s/p L mastectomy 8/5/20 by Dr. Banerjee, in ER with c/o bloody drainage.  Pt has 2 NATASHA drains in place, pt states she's been emptying them out as they fill, tonight noticed some drainage on her clothes and was concerned about bleeding and so came to ER.  Pt had stopped xarelto 3 days prior to surgery and then restarted it the day after surgery.  denies any f/c.  no cp/sob.  no abd pain.  no ha/dizziness/loc.  no other complaints.  PE - nad, nc/at, eomi, perrl, op - clear, cta b/l, no w/r/r, rrr, L ACW dressing in place (c/d/i) with NATASHA drains x 2 in place with bloody fluid, abd - soft, nt/nd, nabs, from x 4, no LE swelling, A&O x 3, no focal neuro deficits.  -check labs, surgery eval.

## 2020-08-09 NOTE — ED PROVIDER NOTE - PATIENT PORTAL LINK FT
You can access the FollowMyHealth Patient Portal offered by [facility] by registering at the following website: [email or web address]. By joining [facility]’s FollowMyHealth portal, you will also be able to view your health information using other applications (apps) compatible with our system.

## 2020-08-09 NOTE — ED PROVIDER NOTE - PROVIDER TOKENS
PROVIDER:[TOKEN:[[id number]],FOLLOWUP:[1-3 Days]],PROVIDER:[TOKEN:[[id number]],FOLLOWUP:[1-3 Days]]

## 2020-08-11 LAB — SURGICAL PATHOLOGY STUDY: SIGNIFICANT CHANGE UP

## 2020-08-12 DIAGNOSIS — C77.3 SECONDARY AND UNSPECIFIED MALIGNANT NEOPLASM OF AXILLA AND UPPER LIMB LYMPH NODES: ICD-10-CM

## 2020-08-12 DIAGNOSIS — I10 ESSENTIAL (PRIMARY) HYPERTENSION: ICD-10-CM

## 2020-08-12 DIAGNOSIS — Z79.01 LONG TERM (CURRENT) USE OF ANTICOAGULANTS: ICD-10-CM

## 2020-08-12 DIAGNOSIS — D64.9 ANEMIA, UNSPECIFIED: ICD-10-CM

## 2020-08-12 DIAGNOSIS — C50.912 MALIGNANT NEOPLASM OF UNSPECIFIED SITE OF LEFT FEMALE BREAST: ICD-10-CM

## 2020-08-12 DIAGNOSIS — E78.00 PURE HYPERCHOLESTEROLEMIA, UNSPECIFIED: ICD-10-CM

## 2020-08-12 DIAGNOSIS — Z17.0 ESTROGEN RECEPTOR POSITIVE STATUS [ER+]: ICD-10-CM

## 2020-08-12 DIAGNOSIS — N63.20 UNSPECIFIED LUMP IN THE LEFT BREAST, UNSPECIFIED QUADRANT: ICD-10-CM

## 2020-08-12 DIAGNOSIS — I48.91 UNSPECIFIED ATRIAL FIBRILLATION: ICD-10-CM

## 2020-08-13 ENCOUNTER — APPOINTMENT (OUTPATIENT)
Dept: BREAST CENTER | Facility: CLINIC | Age: 80
End: 2020-08-13
Payer: MEDICARE

## 2020-08-13 VITALS
TEMPERATURE: 98 F | HEIGHT: 62 IN | SYSTOLIC BLOOD PRESSURE: 120 MMHG | WEIGHT: 127 LBS | BODY MASS INDEX: 23.37 KG/M2 | DIASTOLIC BLOOD PRESSURE: 70 MMHG

## 2020-08-13 PROCEDURE — 99024 POSTOP FOLLOW-UP VISIT: CPT

## 2020-08-13 NOTE — REASON FOR VISIT
[Post Op: _________] : a [unfilled] post op visit [FreeTextEntry1] : s/p left breast mastectomy - 08/05/2020.

## 2020-08-13 NOTE — ASSESSMENT
[FreeTextEntry1] : CARLA is a milton 80 year old patient who presented today for her initial post-operative visit.\par She is feeling well.\par She denies any fever / chills or erythema and / or drainage related to the incision.\par She states that she presented to the ER over the weekend with complaints of bleeding from surrounding the drain sites.\par Her pain is relatively well controlled, complains of soreness of the chest / axillary area.\par Her pathology was discussed.\par \par Plan:\par 1. Oncotype Dx to be sent on surgical specimen.\par 2. Pt to return for reevaluation of mastectomy incision on 08/18/2020.\par \par I spent a total of 20 minutes of face to face time with this patient, greater than 50% of which was spent in counseling and/or coordination of care.\par All of her questions were appropriately answered.\par She knows to call with any concerns.

## 2020-08-13 NOTE — DATA REVIEWED
[FreeTextEntry1] : Surgical Final Report\par Final Diagnosis\par 1. Breast, left axillary sentinel lymph node #1, biopsy:\par - Micro-metastatic carcinoma present in one sentinel lymph node\par (1/1), measuring 1.8 mm (microscopic measurement).\par - No extracapsular extension is seen.\par \par 2. Breast, left axillary sentinel lymph node #2, biopsy:\par - Two benign lymph nodes (0/2). Negative for carcinoma with\par evaluation of multiple H T E levels and with negative\par immunohistochemical stains for cytokeratins (CK AE1/AE3 and CK\par 8/18).\par \par 3. 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 However, no\par involvement of the overlying epidermis is seen.\par - Surrounding markedly atrophic fatty breast tissue.\par - Benign skin, nipple with retraction, and deep fascial margin\par including skeletal muscle. Negative for carcinoma.\par - Pending special studies for ER/IL proteins, proliferation\par index (Ki-67), and HER2/RUPAL oncoprotein expression and/or gene\par amplification, with results to be reported as a separate\par addendum.\par - AJCC 8th Edition Pathologic Stage: pT2, pN1mi, pMx.\par \par 4. Breast, left axillary contents, excision:\par - Eighteen benign axillary lymph nodes (0/18). Negative for\par carcinoma.\par

## 2020-08-13 NOTE — HISTORY OF PRESENT ILLNESS
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 \par Self palpated finding; her last screening mammogram was more than ten years ago.  Patient complains of left breast mass with left nipple inversion. \par Her family history is significant for her sister with breast cancer at 61, her father with stomach cancer at 49, her other sister with pancreatic cancer at 75.  No genetic testing in the patient or her family. \par \par SOZO L-Dex Score: -0.6\par Date: 7/2/20\par \par Status post Left breast mastectomy with  axillary node dissection - 08/05/2020 - micro-metastatic carcinoma present in one sentinel lymph node (1/1), measuring 1.8 mm (microscopic measurement), No extracapsular extension is seen. Two benign lymph nodes (0/2). Breast, left axillary contents, excision: Eighteen benign axillary lymph nodes (0/18). Negative for carcinoma.\par \par Pt presents to the office for her initial post-operative visit.\par She is feeling well.\par She denies any fever / chills or erythema and / or drainage related to the incision.\par She states that she had to go to the ER over the weekend w/ complaints of bleeding from surrounding the drain sites.\par Her pain is relatively well controlled, complains of soreness of the chest / axillary area.

## 2020-08-13 NOTE — PAST MEDICAL HISTORY
[Postmenopausal] : The patient is postmenopausal [Menarche Age ____] : age at menarche was [unfilled] [Menopause Age____] : age at menopause was [unfilled] [Total Preg ___] : G[unfilled] [History of Hormone Replacement Treatment] : has no history of hormone replacement treatment [FreeTextEntry5] : D&C [FreeTextEntry7] : none [FreeTextEntry6] : none [FreeTextEntry8] : none

## 2020-08-13 NOTE — REVIEW OF SYSTEMS
[Negative] : Constitutional [Breast Pain] : breast pain [Fever] : no fever [Chills] : no chills [de-identified] : (+) bruising

## 2020-08-18 ENCOUNTER — APPOINTMENT (OUTPATIENT)
Dept: BREAST CENTER | Facility: CLINIC | Age: 80
End: 2020-08-18
Payer: MEDICARE

## 2020-08-18 VITALS
WEIGHT: 127 LBS | SYSTOLIC BLOOD PRESSURE: 112 MMHG | TEMPERATURE: 97.8 F | BODY MASS INDEX: 23.37 KG/M2 | DIASTOLIC BLOOD PRESSURE: 78 MMHG | HEIGHT: 62 IN

## 2020-08-18 DIAGNOSIS — Z91.89 OTHER SPECIFIED PERSONAL RISK FACTORS, NOT ELSEWHERE CLASSIFIED: ICD-10-CM

## 2020-08-18 PROCEDURE — 99024 POSTOP FOLLOW-UP VISIT: CPT

## 2020-08-18 PROCEDURE — 93702 BIS XTRACELL FLUID ANALYSIS: CPT

## 2020-08-18 NOTE — PAST MEDICAL HISTORY
[History of Hormone Replacement Treatment] : has no history of hormone replacement treatment [FreeTextEntry6] : none [FreeTextEntry5] : D&C [FreeTextEntry8] : none [FreeTextEntry7] : none

## 2020-08-18 NOTE — DATA REVIEWED
[FreeTextEntry1] : Surgical Final Report\par Final Diagnosis\par 1. Breast, left axillary sentinel lymph node #1, biopsy:\par - Micro-metastatic carcinoma present in one sentinel lymph node\par (1/1), measuring 1.8 mm (microscopic measurement).\par - No extracapsular extension is seen.\par \par 2. Breast, left axillary sentinel lymph node #2, biopsy:\par - Two benign lymph nodes (0/2). Negative for carcinoma with\par evaluation of multiple H T E levels and with negative\par immunohistochemical stains for cytokeratins (CK AE1/AE3 and CK\par 8/18).\par \par 3. 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 However, no\par involvement of the overlying epidermis is seen.\par - Surrounding markedly atrophic fatty breast tissue.\par - Benign skin, nipple with retraction, and deep fascial margin\par including skeletal muscle. Negative for carcinoma.\par - Pending special studies for ER/MN proteins, proliferation\par index (Ki-67), and HER2/RUPAL oncoprotein expression and/or gene\par amplification, with results to be reported as a separate\par addendum.\par - AJCC 8th Edition Pathologic Stage: pT2, pN1mi, pMx.\par \par 4. Breast, left axillary contents, excision:\par - Eighteen benign axillary lymph nodes (0/18). Negative for\par carcinoma.\par

## 2020-08-18 NOTE — ASSESSMENT
[FreeTextEntry1] : CARLA is a milton 80 year old patient who presented today for a post-operative visit.\par She is feeling well.\par She denies any fever / chills or erythema and / or drainage related to the incision.\par Her pain is relatively well controlled, complains of soreness of the chest / axillary area.\par \par Plan:\par 1. Pt to return for another postoperative visit in four weeks.\par 2. An appointment for initial consultation with Dr. Chávez of medical oncology will be arranged.\par \par I spent a total of 20 minutes of face to face time with this patient, greater than 50% of which was spent in counseling and/or coordination of care.\par All of her questions were appropriately answered.\par She knows to call with any concerns.

## 2020-08-18 NOTE — REVIEW OF SYSTEMS
[Breast Pain] : breast pain [Negative] : Constitutional [Fever] : no fever [Chills] : no chills [de-identified] : (+) bruising, improving.

## 2020-08-18 NOTE — HISTORY OF PRESENT ILLNESS
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 \par Self palpated finding; her last screening mammogram was more than ten years ago.  Patient complains of left breast mass with left nipple inversion. \par Her family history is significant for her sister with breast cancer at 61, her father with stomach cancer at 49, her other sister with pancreatic cancer at 75.  No genetic testing in the patient or her family. \par \par SOZO Left L-Dex Score: \par -0.6 (07/02/2020)\par 0.9 (08/18/2020)\par \par Status post Left breast mastectomy with  axillary node dissection - 08/05/2020 - micro-metastatic carcinoma present in one sentinel lymph node (1/1), measuring 1.8 mm (microscopic measurement), No extracapsular extension is seen. Two benign lymph nodes (0/2). Breast, left axillary contents, excision: Eighteen benign axillary lymph nodes (0/18). Negative for carcinoma.\par \par Pt presents to the office for a post-operative visit.\par She is feeling well.\par She denies any fever / chills or erythema and / or drainage related to the incision.\par Her pain is relatively well controlled, complains of soreness of the chest / axillary area.

## 2020-09-08 ENCOUNTER — APPOINTMENT (OUTPATIENT)
Dept: BREAST CENTER | Facility: CLINIC | Age: 80
End: 2020-09-08
Payer: MEDICARE

## 2020-09-08 VITALS
WEIGHT: 127 LBS | DIASTOLIC BLOOD PRESSURE: 70 MMHG | TEMPERATURE: 97.9 F | BODY MASS INDEX: 23.37 KG/M2 | SYSTOLIC BLOOD PRESSURE: 112 MMHG | HEIGHT: 62 IN

## 2020-09-08 PROCEDURE — 99024 POSTOP FOLLOW-UP VISIT: CPT

## 2020-09-08 NOTE — REASON FOR VISIT
[Post Op: _________] : a [unfilled] post op visit [Formal Caregiver] : formal caregiver [FreeTextEntry1] : s/p left breast mastectomy - 08/05/2020.

## 2020-09-08 NOTE — ASSESSMENT
[FreeTextEntry1] : CARLA is a milton 80 year old patient who presented today for a post-operative visit.\par She is feeling well.\par She denies any fever / chills or erythema and / or drainage related to the incision.\par She is not currently experiencing any pain and her range of motion is good.\par \par Plan:\par 1. Pt to return for another follow-up visit in three months.\par 2. An appointment for initial consultation with Dr. Chávez of medical oncology is scheduled for 09/11/2020.\par \par I spent a total of 20 minutes of face to face time with this patient, greater than 50% of which was spent in counseling and/or coordination of care.\par All of her questions were appropriately answered.\par She knows to call with any concerns.

## 2020-09-08 NOTE — HISTORY OF PRESENT ILLNESS
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 \par Self palpated finding; her last screening mammogram was more than ten years ago.  Patient complains of left breast mass with left nipple inversion. \par Her family history is significant for her sister with breast cancer at 61, her father with stomach cancer at 49, her other sister with pancreatic cancer at 75.  No genetic testing in the patient or her family. \par \par SOZO Left L-Dex Score: \par -0.6 (07/02/2020)\par 0.9 (08/18/2020)\par \par Status post Left breast mastectomy with  axillary node dissection - 08/05/2020 - micro-metastatic carcinoma present in one sentinel lymph node (1/1), measuring 1.8 mm (microscopic measurement), No extracapsular extension is seen. Two benign lymph nodes (0/2). Breast, left axillary contents, excision: Eighteen benign axillary lymph nodes (0/18). Negative for carcinoma.\par Oncotype Dx = 28.\par \par Pt presents to the office for a post-operative visit.\par She is feeling well.\par She denies any fever / chills or erythema and / or drainage related to the incision.\par She is not currently experiencing any pain and her range of motion is good.

## 2020-09-11 ENCOUNTER — APPOINTMENT (OUTPATIENT)
Dept: HEMATOLOGY ONCOLOGY | Facility: CLINIC | Age: 80
End: 2020-09-11
Payer: MEDICARE

## 2020-09-11 ENCOUNTER — OUTPATIENT (OUTPATIENT)
Dept: OUTPATIENT SERVICES | Facility: HOSPITAL | Age: 80
LOS: 1 days | Discharge: HOME | End: 2020-09-11

## 2020-09-11 VITALS
WEIGHT: 130 LBS | SYSTOLIC BLOOD PRESSURE: 108 MMHG | HEART RATE: 86 BPM | BODY MASS INDEX: 23.92 KG/M2 | HEIGHT: 62 IN | TEMPERATURE: 96.1 F | DIASTOLIC BLOOD PRESSURE: 87 MMHG

## 2020-09-11 DIAGNOSIS — Z98.890 OTHER SPECIFIED POSTPROCEDURAL STATES: Chronic | ICD-10-CM

## 2020-09-11 PROCEDURE — 99205 OFFICE O/P NEW HI 60 MIN: CPT

## 2020-09-11 RX ORDER — CALCIUM CARBONATE/VITAMIN D3 600 MG-20
600-800 TABLET ORAL
Qty: 100 | Refills: 2 | Status: ACTIVE | COMMUNITY
Start: 2020-09-11 | End: 1900-01-01

## 2020-09-16 NOTE — HISTORY OF PRESENT ILLNESS
[de-identified] : 79 yo female with PMH of HTN , DLD , Afib on Xarelto , anemia , was referred to us by Dr Banerjee for consultation of left breast IDC. Pt reports that in May, 2020, she was found to have severe anemia on blood work. Hb was 5.6 with MCV of 76. Iron studies showed serum iron 27 , iron binding capacity 372 ,  % sat 7% and ferritin of 13.  She was transfused 3 units of PRBC and Xarelto was held . GI w/u was was unremarkable. During that time on physical exam pt was found to have left breast palpable mass with nipple inversion. She was then referred for a mammogram  on 6/19 that showed irregular and spiculated hypoechoic mass in left breast approx 3 x 3.5 cm , highly suspicious for malignancy. Ultrasound guided core biopsy on 6/17/202 showed IDC with focal papillary features associated with microcalcifications, ER %, [place] 1-10%, HER2 Equivocal by IHC, FISH-Negative, Ki-67 40%.\par \par Pt saw Dr Banerjee and underwent left mastectomy, SLNB and axillary node dissection 08/05/2020. Final pathology revealed: \par 1. Breast, left axillary sentinel lymph node #1, biopsy:\par - Micro-metastatic carcinoma present in one sentinel lymph node (1/1), measuring 1.8 mm (microscopic measurement).\par - No extracapsular extension is seen.\par 2. Breast, left axillary sentinel lymph node #2, biopsy:\par - Two benign lymph nodes (0/2). Negative for carcinoma with evaluation of multiple H T E levels and with negative immunohistochemical stains for cytokeratins.\par 3. Breast, left, modified radical mastectomy:\par - Retroareolar healing prior biopsy site with invasive poorly differentiated ductal carcinoma, 3.5 cm (radiologic measurement), with both calcifications and coagulative tumoral necrosis. 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 However, no involvement of the overlying epidermis is seen.\par - Surrounding markedly atrophic fatty breast tissue.\par - Benign skin, nipple with retraction, and deep fascial margin including skeletal muscle. Negative for carcinoma.\par - AJCC 8th Edition Pathologic Stage: pT2, pN1mi, pMx.\par 4. Breast, left axillary contents, excision:\par - Eighteen benign axillary lymph nodes (0/18). Negative for carcinoma.\par \par The surgical specimen was sent for Oncotype Dx assay. Her RS is 28, in the intermediate risk range and predicting 22% risk of distant recurrence at 9 years with endocrine therapy alone.  \par \par She recovered well from surgery. She is here with her formal caregiver today to discuss adjuvant systemic therapy. 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 She has ECOG of 1 . She has extensive family history with her sister diagnosed with breast cancer at 61, her father with stomach cancer at 49, her other sister with pancreatic cancer at 75. No genetic testing in the patient or her family. \par \par \par

## 2020-09-16 NOTE — PHYSICAL EXAM
[Restricted in physically strenuous activity but ambulatory and able to carry out work of a light or sedentary nature] : Status 1- Restricted in physically strenuous activity but ambulatory and able to carry out work of a light or sedentary nature, e.g., light house work, office work [Normal] : grossly intact [de-identified] : Status post left breast mastectomy. Surgical scar is healing well. There is no palpable lumps or adenopathy in right breast and right axilla.

## 2020-09-16 NOTE — CONSULT LETTER
[Dear  ___] : Dear  [unfilled], [Consult Letter:] : I had the pleasure of evaluating your patient, [unfilled]. [Please see my note below.] : Please see my note below. [Consult Closing:] : Thank you very much for allowing me to participate in the care of this patient.  If you have any questions, please do not hesitate to contact me. [FreeTextEntry3] : Claudia Chávez MD [Sincerely,] : Sincerely, [DrServando  ___] : Dr. CHOUDHARY

## 2020-09-16 NOTE — ASSESSMENT
[FreeTextEntry1] : 81 yo female has stage IIB ([id number]) IDC of the left breast, G3, ER positives 100%, OK 10%, Her-2 negative, s/p left breast mastectomy, SLNB and ALND.\par Oncotype RS is 28, in the intermediate risk range and predicting 22% risk of distant recurrence at 9 years with endocrine therapy alone.  \par \par Recommendation:\par A detailed discussion was held with the patient regarding her diagnosis, stage, prognosis and options on adjuvant systemic therapy. The pathology and Oncotype analysis reports were reviewed. Hazel has hormone receptor positive and Her-2 negative early stage breast with clinical high risk features (tumor > 2 cm, G3, one positive SLN with mirocromets). Her Oncotype RS is 28, in the intermediate risk range and predicting 22% risk of distant recurrence at 9 years with endocrine therapy alone.  We discussed using Oncotype recurrence score to predict the chemotherapy benefit. For the patient with low risk RS, chemotherapy may be avoided due to minimal benefit. For the patients with high risk RS, adjuvant chemotherapy is beneficial and is strongly recommended. However, for the patient with intermediate RS, a cut off point to recommend chemotherapy remains unclear. Adjuvant chemotherapy is recommended in the patient with node positive early stage breast cancer with RS > 18 as per NCCN guideline. However, decision on chemotherapy should be also stratified based on patient tolerability, benefit and risk ratio. \par \par Given her elderly age and medical comorbidities, it will be difficulty for her to go through chemotherapy. Chemotherapy toxicity is likely outweigh the benefit. Hazel does not want to have chemotherapy either.  She is recommended to take adjuvant endocrine therapy with Letrozole. We reviewed the benefit and side effect. \par The potential sided effects may include but not limit to muscular and skeletal symptoms, arthralgia, increasing osteopenia and osteoporosis, a small increased risk of cardiovascular events and slight increase of hyperlipidemia.  \par \par We discussed bone health management given Letrozole may reduce bone density. She is advised to take calcium and vitamin D supplement. Encourage health life style and regular physical activities. She is given a referral for bone density.\par \par Hazel comprehends information well. All her questions were answered. She agrees to take Letrozole. A script was sent to her pharmacy. She will come back for followup in 3 months. She will call if there is any new concerns. \par \par We also discussed genetic testing given her personal and family h/o breast cancer, and family h/o pancreatic cancer and stomach cancers. However, she is not interested in it. \par .\par

## 2020-09-21 DIAGNOSIS — C50.412 MALIGNANT NEOPLASM OF UPPER-OUTER QUADRANT OF LEFT FEMALE BREAST: ICD-10-CM

## 2020-09-21 DIAGNOSIS — Z79.811 LONG TERM (CURRENT) USE OF AROMATASE INHIBITORS: ICD-10-CM

## 2020-10-16 ENCOUNTER — EMERGENCY (EMERGENCY)
Facility: HOSPITAL | Age: 80
LOS: 0 days | Discharge: HOME | End: 2020-10-16
Attending: STUDENT IN AN ORGANIZED HEALTH CARE EDUCATION/TRAINING PROGRAM | Admitting: SPECIALIST
Payer: MEDICARE

## 2020-10-16 VITALS
TEMPERATURE: 99 F | RESPIRATION RATE: 17 BRPM | DIASTOLIC BLOOD PRESSURE: 69 MMHG | HEART RATE: 75 BPM | OXYGEN SATURATION: 98 % | SYSTOLIC BLOOD PRESSURE: 141 MMHG | HEIGHT: 67 IN

## 2020-10-16 DIAGNOSIS — Z71.1 PERSON WITH FEARED HEALTH COMPLAINT IN WHOM NO DIAGNOSIS IS MADE: ICD-10-CM

## 2020-10-16 DIAGNOSIS — Z98.890 OTHER SPECIFIED POSTPROCEDURAL STATES: Chronic | ICD-10-CM

## 2020-10-16 DIAGNOSIS — Z85.3 PERSONAL HISTORY OF MALIGNANT NEOPLASM OF BREAST: ICD-10-CM

## 2020-10-16 DIAGNOSIS — Z87.891 PERSONAL HISTORY OF NICOTINE DEPENDENCE: ICD-10-CM

## 2020-10-16 DIAGNOSIS — R79.9 ABNORMAL FINDING OF BLOOD CHEMISTRY, UNSPECIFIED: ICD-10-CM

## 2020-10-16 LAB
BASE EXCESS BLDV CALC-SCNC: -4.6 MMOL/L — LOW (ref -2–2)
CA-I SERPL-SCNC: 1.24 MMOL/L — SIGNIFICANT CHANGE UP (ref 1.12–1.3)
GAS PNL BLDV: 141 MMOL/L — SIGNIFICANT CHANGE UP (ref 136–145)
GAS PNL BLDV: SIGNIFICANT CHANGE UP
HCO3 BLDV-SCNC: 22 MMOL/L — SIGNIFICANT CHANGE UP (ref 22–29)
HCT VFR BLDA CALC: 39.3 % — SIGNIFICANT CHANGE UP (ref 34–44)
HGB BLD CALC-MCNC: 12.8 G/DL — LOW (ref 14–18)
LACTATE BLDV-MCNC: 1 MMOL/L — SIGNIFICANT CHANGE UP (ref 0.5–1.6)
PCO2 BLDV: 43 MMHG — SIGNIFICANT CHANGE UP (ref 41–51)
PH BLDV: 7.31 — SIGNIFICANT CHANGE UP (ref 7.26–7.43)
PO2 BLDV: 39 MMHG — SIGNIFICANT CHANGE UP (ref 20–40)
POTASSIUM BLDV-SCNC: 4.5 MMOL/L — SIGNIFICANT CHANGE UP (ref 3.3–5.6)
SAO2 % BLDV: 68 % — SIGNIFICANT CHANGE UP

## 2020-10-16 PROCEDURE — 93010 ELECTROCARDIOGRAM REPORT: CPT

## 2020-10-16 PROCEDURE — 99284 EMERGENCY DEPT VISIT MOD MDM: CPT

## 2020-10-16 NOTE — ED ADULT NURSE NOTE - OBJECTIVE STATEMENT
Patient came in with complaints of abnormal blood result. Pt. states " I had a blood works yesterday and my doctor said, my potassium is high and recommended me to go to ER." denies chest pain, discomfort, weakness, tingling or cramps.

## 2020-10-16 NOTE — ED PROVIDER NOTE - PHYSICAL EXAMINATION
Constitutional: Well developed, well nourished. NAD  Head: Normocephalic, atraumatic.  Eyes: PERRL, EOMI.  ENT: No nasal discharge. Mucous membranes dry.  Neck: Supple. Painless ROM.  Cardiovascular: Hr irreg irreg; no rvr  Pulmonary: Lungs clear to auscultation bilaterally.   Abdominal: Soft. Nondistended. No rebound, guarding, rigidity.  Extremities. Pelvis stable. No lower extremity edema, symmetric calves.  Skin: No rashes, cyanosis.  Neuro: AAOx3. No focal neurological deficits.  Psych: Normal mood. Normal affect.

## 2020-10-16 NOTE — ED PROVIDER NOTE - CLINICAL SUMMARY MEDICAL DECISION MAKING FREE TEXT BOX
80 yr old f that presents due to abnormal labs. pt has no medical complaints. vbg and ekg obtained. pt informed of findings. pt discharged. pt given strict return precautions.

## 2020-10-16 NOTE — ED PROVIDER NOTE - OBJECTIVE STATEMENT
80 yold female to ED Pmhx Afib on xarelto, Left breast cancer s/p mastectomy, Htn, Hld, DM - sent to Ed for abn labs; pt had routine labs done yesterday by pmd Dr. Stafford, who instructed pt to go to hospital for K of 7; pt with similar issues in past due to lab error/hemolysis; pt denies any complaints, denies n/v/diarrhea, fever, chills;

## 2020-10-16 NOTE — ED ADULT NURSE NOTE - CHIEF COMPLAINT QUOTE
"I had my blood drawn yesterday with Dr. Stafford and she gave me the results today and my potassium is high and so she called to come get it retaken" Pt was told potassium read was 7. Pt denies any pain or discomfort

## 2020-10-16 NOTE — ED ADULT TRIAGE NOTE - CHIEF COMPLAINT QUOTE
"I had my blood drawn yesterday with Dr. Stafford and she gave me the results today and my potassium is high and so she called to come get it retaken" Pt was told potassium read was 7. "I had my blood drawn yesterday with Dr. Stafford and she gave me the results today and my potassium is high and so she called to come get it retaken" Pt was told potassium read was 7. Pt denies any pain or discomfort

## 2020-10-16 NOTE — ED ADULT NURSE NOTE - NSIMPLEMENTINTERV_GEN_ALL_ED
Implemented All Universal Safety Interventions:  [place] to call system. Call bell, personal items and telephone within reach. Instruct patient to call for assistance. Room bathroom lighting operational. Non-slip footwear when patient is off stretcher. Physically safe environment: no spills, clutter or unnecessary equipment. Stretcher in lowest position, wheels locked, appropriate side rails in place.

## 2020-10-16 NOTE — ED PROVIDER NOTE - CARE PROVIDER_API CALL
Nidhi Stafford)  Internal Medicine  [address] [place] [address]  Phone: [phone]  Fax: [phone]  Established Patient  Follow Up Time: 1-3 Days

## 2020-10-16 NOTE — ED PROVIDER NOTE - PATIENT PORTAL LINK FT
You can access the FollowMyHealth Patient Portal offered by [facility] by registering at the following website: [email or web address]. By joining [employer]’s FollowMyHealth portal, you will also be able to view your health information using other applications (apps) compatible with our system.

## 2020-10-16 NOTE — ED PROVIDER NOTE - ATTENDING CONTRIBUTION TO CARE
80 yold female to ED Pmhx Afib on xarelto, Left breast cancer s/p mastectomy, Htn, Hld, DM who presents today with abnormal labs. Pt states that she was called because her K was elevated. Pt has no medical complaints. Pt denies any chest pain, SOB, nausea, vomiting, fevers, chills or any other complaints.     Review of Systems    Constitutional: (-) fever  Cardiovascular: (-) chest pain, (-) syncope  Respiratory: (-) cough, (-) shortness of breath  Gastrointestinal: (-) vomiting, (-) diarrhea, (-) abdominal pain  Musculoskeletal: (-) neck pain, (-) back pain, (-) joint pain  Integumentary: (-) rash, (-) edema  Neurological: (-) headache, (-) altered mental status    Except as documented in the HPI, all other systems are negative.    VITAL SIGNS: I have reviewed nursing notes and confirm.  CONSTITUTIONAL: non-toxic, well appearing  SKIN: no rash, no petechiae.  EYES: PERRL, EOMI, pink conjunctiva, anicteric  ENT: tongue midline, no exudates, MMM  NECK: Supple; no meningismus, no JVD  CARD: RRR, no murmurs, equal radial pulses bilaterally 2+  RESP: CTAB, no respiratory distress  ABD: Soft, non-tender, non-distended, no peritoneal signs, no HSM, no CVA tenderness  EXT: Normal ROM x4. No edema. No calves tenderness  NEURO: Alert, oriented. CN2-12 intact, equal strength bilaterally, nl gait.  PSYCH: Cooperative, appropriate.    a/p  80 yr old f that presents with abnormal labs, no medical complaints. Will obtain vbg and ekg. Dispo per above 80 yold female to ED Pmhx Afib on xarelto, Left breast cancer s/p mastectomy, Htn, Hld, DM who presents today with abnormal labs. Pt states that she was called because her K was elevated. Pt has no medical complaints. Pt denies any chest pain, SOB, nausea, vomiting, fevers, chills or any other complaints.     Review of Systems    Constitutional: (-) fever  Cardiovascular: (-) chest pain, (-) syncope  Respiratory: (-) cough, (-) shortness of breath  Gastrointestinal: (-) vomiting, (-) diarrhea, (-) abdominal pain  Musculoskeletal: (-) neck pain, (-) back pain, (-) joint pain  Integumentary: (-) rash, (-) edema  Neurological: (-) headache, (-) altered mental status    Except as documented in the HPI, all other systems are negative.    VITAL SIGNS: I have reviewed nursing notes and confirm.  CONSTITUTIONAL: non-toxic, well appearing  SKIN: no rash, no petechiae.  EYES: PERRL, EOMI, pink conjunctiva, anicteric  ENT: tongue midline, no exudates, MMM  NECK: Supple; no meningismus, no JVD  CARD: irregularly irregular   RESP: CTAB, no respiratory distress  ABD: Soft, non-tender, non-distended, no peritoneal signs, no HSM, no CVA tenderness  EXT: Normal ROM x4. No edema. No calves tenderness  NEURO: Alert, oriented. CN2-12 intact, equal strength bilaterally, nl gait.  PSYCH: Cooperative, appropriate.    a/p  80 yr old f that presents with abnormal labs, no medical complaints. Will obtain vbg and ekg. Dispo per above

## 2020-12-09 ENCOUNTER — APPOINTMENT (OUTPATIENT)
Dept: CARDIOLOGY | Facility: CLINIC | Age: 80
End: 2020-12-09
Payer: MEDICARE

## 2020-12-09 VITALS
TEMPERATURE: 97.3 F | BODY MASS INDEX: 24.88 KG/M2 | DIASTOLIC BLOOD PRESSURE: 70 MMHG | HEART RATE: 92 BPM | WEIGHT: 136 LBS | SYSTOLIC BLOOD PRESSURE: 110 MMHG

## 2020-12-09 DIAGNOSIS — I48.91 UNSPECIFIED ATRIAL FIBRILLATION: ICD-10-CM

## 2020-12-09 PROCEDURE — 99213 OFFICE O/P EST LOW 20 MIN: CPT

## 2020-12-09 PROCEDURE — 93000 ELECTROCARDIOGRAM COMPLETE: CPT

## 2020-12-09 NOTE — PHYSICAL EXAM
[General Appearance - Well Developed] : well developed [General Appearance - In No Acute Distress] : no acute distress [Normal Conjunctiva] : the conjunctiva exhibited no abnormalities [Eyelids - No Xanthelasma] : the eyelids demonstrated no xanthelasmas [Respiration, Rhythm And Depth] : normal respiratory rhythm and effort [Exaggerated Use Of Accessory Muscles For Inspiration] : no accessory muscle use [Auscultation Breath Sounds / Voice Sounds] : lungs were clear to auscultation bilaterally [Abdomen Soft] : soft [Abdomen Tenderness] : non-tender [Abdomen Mass (___ Cm)] : no abdominal mass palpated [Nail Clubbing] : no clubbing of the fingernails [Cyanosis, Localized] : no localized cyanosis [Skin Color & Pigmentation] : normal skin color and pigmentation [] : no rash [No Skin Ulcers] : no skin ulcer [Oriented To Time, Place, And Person] : oriented to person, place, and time [FreeTextEntry1] : irregular, no LE edema

## 2020-12-09 NOTE — HISTORY OF PRESENT ILLNESS
[FreeTextEntry1] : 81 yo F with h/o Persistent AF on Xarelto, HTN presents for follow up and risk stratification prior to left mastectomy and LND.  Recent acute severe anemia requiring PRBC transfusion in June, EGD and colonoscopy both unremarkable, taking iron supplements.  Remains asymptomatic from a cardiac standpoint.  METs >4.\par \par Now s/p mastectomy on 8/5/2020.  Overall stable and doing well.  Denies any chest pain, dyspnea, palpitations.  Rate controlled on Metoprolol.  Gained 6 lbs.  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

## 2020-12-09 NOTE — DISCUSSION/SUMMARY
[FreeTextEntry1] : Continue Xarelto\par Continue Metoprolol 100 mg BID and Losartan\par Follow up 6 months

## 2020-12-15 ENCOUNTER — APPOINTMENT (OUTPATIENT)
Dept: BREAST CENTER | Facility: CLINIC | Age: 80
End: 2020-12-15

## 2020-12-23 PROBLEM — Z01.810 ENCOUNTER FOR PRE-OPERATIVE CARDIOVASCULAR CLEARANCE: Status: RESOLVED | Noted: 2020-08-04 | Resolved: 2020-12-23

## 2021-01-21 ENCOUNTER — APPOINTMENT (OUTPATIENT)
Dept: BREAST CENTER | Facility: CLINIC | Age: 81
End: 2021-01-21
Payer: MEDICARE

## 2021-01-21 VITALS
WEIGHT: 136 LBS | SYSTOLIC BLOOD PRESSURE: 118 MMHG | HEIGHT: 62 IN | BODY MASS INDEX: 25.03 KG/M2 | DIASTOLIC BLOOD PRESSURE: 80 MMHG | TEMPERATURE: 98 F

## 2021-01-21 DIAGNOSIS — Z90.12 ACQUIRED ABSENCE OF LEFT BREAST AND NIPPLE: ICD-10-CM

## 2021-01-21 PROCEDURE — 99213 OFFICE O/P EST LOW 20 MIN: CPT

## 2021-01-21 NOTE — ASSESSMENT
[FreeTextEntry1] : CARLA is a [place] 80 year old patient who presented today in follow up for a history of left breast cancer; s/p left simple mastectomy.  \par She has been doing well with no new breast related complaints. \par Physical exam was unrevealing today.\par \par She has met with Dr. Chávez for consultation and was started on Letrozole.\par \par Imaging with a right unilateral screening mammogram will be due in June 2021, and that will be scheduled today. \par She will return for follow-up and clinical breast exam in July 2021.\par She will continue follow-up with medical oncology as well.\par \par I spent a total of 15 minutes of face to face time with this patient, greater than 50% of which was spent in counseling and/or coordination of care.\par All of her questions were appropriately answered.\par She knows to call with any concerns.\par \par \par \par \par \par

## 2021-01-21 NOTE — PHYSICAL EXAM
[Normocephalic] : normocephalic [Atraumatic] : atraumatic [No Supraclavicular Adenopathy] : no supraclavicular adenopathy [No Cervical Adenopathy] : no cervical adenopathy [Examined in the supine and seated position] : examined in the supine and seated position [No dominant masses] : no dominant masses in right breast  [No dominant masses] : no dominant masses left breast [No Nipple Discharge] : no right nipple discharge [No Axillary Lymphadenopathy] : no left axillary lymphadenopathy [No Rashes] : no rashes [No Ulceration] : no ulceration [Breast Nipple Inversion Right] : nipple not inverted [Breast Nipple Retraction Right] : nipple not retracted [de-identified] : well healed surgical scars. s/p mastectomy.

## 2021-01-21 NOTE — REASON FOR VISIT
[Follow-Up: _____] : a [unfilled] follow-up visit [FreeTextEntry1] : s/p left breast mastectomy - 08/05/2020.

## 2021-01-21 NOTE — HISTORY OF PRESENT ILLNESS
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 \par Self palpated finding; her last screening mammogram was more than ten years ago.  Patient complains of left breast mass with left nipple inversion. \par Her family history is significant for her sister with breast cancer at 61, her father with stomach cancer at 49, her other sister with pancreatic cancer at 75.  No genetic testing in the patient or her family. \par \par SOZO Left L-Dex Score: \par -0.6 (07/02/2020)\par 0.9 (08/18/2020)\par \par Status post Left breast mastectomy with  axillary node dissection - 08/05/2020 - micro-metastatic carcinoma present in one sentinel lymph node (1/1), measuring 1.8 mm (microscopic measurement), No extracapsular extension is seen. Two benign lymph nodes (0/2). Breast, left axillary contents, excision: Eighteen benign axillary lymph nodes (0/18). Negative for carcinoma.\par Oncotype Dx = 28.\par \par Dr. Chávez - Letrozole.

## 2021-01-21 NOTE — REVIEW OF SYSTEMS
[Negative] : Constitutional [Breast Pain] : no breast pain [Breast Lump] : no breast lump [Breast Swelling] : no breast swelling [Breast Reddening] : no reddening of the breast

## 2021-01-25 DIAGNOSIS — I34.0 NONRHEUMATIC MITRAL (VALVE) INSUFFICIENCY: ICD-10-CM

## 2021-02-02 RX ORDER — LOSARTAN POTASSIUM 50 MG/1
50 TABLET, FILM COATED ORAL
Qty: 90 | Refills: 3 | Status: ACTIVE | COMMUNITY
Start: 1900-01-01 | End: 1900-01-01

## 2021-02-02 RX ORDER — RIVAROXABAN 20 MG/1
20 TABLET, FILM COATED ORAL
Qty: 90 | Refills: 3 | Status: ACTIVE | COMMUNITY
Start: 2019-07-10 | End: 1900-01-01

## 2021-02-23 NOTE — ED PROVIDER NOTE - NS ED MD EM SELECTION
[id number] Comprehensive Fluconazole Counseling:  Patient counseled regarding adverse effects of fluconazole including but not limited to headache, diarrhea, nausea, upset stomach, liver function test abnormalities, taste disturbance, and stomach pain.  There is a rare possibility of liver failure that can occur when taking fluconazole.  The patient understands that monitoring of LFTs and kidney function test may be required, especially at baseline. The patient verbalized understanding of the proper use and possible adverse effects of fluconazole.  All of the patient's questions and concerns were addressed.

## 2021-03-11 ENCOUNTER — APPOINTMENT (OUTPATIENT)
Dept: HEMATOLOGY ONCOLOGY | Facility: CLINIC | Age: 81
End: 2021-03-11
Payer: MEDICARE

## 2021-03-11 ENCOUNTER — OUTPATIENT (OUTPATIENT)
Dept: OUTPATIENT SERVICES | Facility: HOSPITAL | Age: 81
LOS: 1 days | Discharge: HOME | End: 2021-03-11

## 2021-03-11 VITALS
TEMPERATURE: 98.6 F | DIASTOLIC BLOOD PRESSURE: 80 MMHG | BODY MASS INDEX: 26.06 KG/M2 | HEART RATE: 105 BPM | HEIGHT: 61 IN | WEIGHT: 138 LBS | SYSTOLIC BLOOD PRESSURE: 120 MMHG

## 2021-03-11 DIAGNOSIS — C50.412 MALIGNANT NEOPLASM OF UPPER-OUTER QUADRANT OF LEFT FEMALE BREAST: ICD-10-CM

## 2021-03-11 DIAGNOSIS — Z51.81 ENCOUNTER FOR THERAPEUTIC DRUG LVL MONITORING: ICD-10-CM

## 2021-03-11 DIAGNOSIS — Z98.890 OTHER SPECIFIED POSTPROCEDURAL STATES: Chronic | ICD-10-CM

## 2021-03-11 DIAGNOSIS — Z79.811 ENCOUNTER FOR THERAPEUTIC DRUG LVL MONITORING: ICD-10-CM

## 2021-03-11 DIAGNOSIS — M85.80 OTHER SPECIFIED DISORDERS OF BONE DENSITY AND STRUCTURE, UNSPECIFIED SITE: ICD-10-CM

## 2021-03-11 DIAGNOSIS — Z79.811 LONG TERM (CURRENT) USE OF AROMATASE INHIBITORS: ICD-10-CM

## 2021-03-11 DIAGNOSIS — Z17.0 MALIGNANT NEOPLASM OF UPPER-OUTER QUADRANT OF LEFT FEMALE BREAST: ICD-10-CM

## 2021-03-11 PROCEDURE — 99214 OFFICE O/P EST MOD 30 MIN: CPT

## 2021-03-11 RX ORDER — LETROZOLE TABLETS 2.5 MG/1
2.5 TABLET, FILM COATED ORAL DAILY
Qty: 90 | Refills: 2 | Status: ACTIVE | COMMUNITY
Start: 2020-09-11 | End: 1900-01-01

## 2021-03-14 PROBLEM — C50.412 MALIGNANT NEOPLASM OF UPPER-OUTER QUADRANT OF LEFT BREAST IN FEMALE, ESTROGEN RECEPTOR POSITIVE: Status: ACTIVE | Noted: 2020-07-02

## 2021-03-23 PROBLEM — M85.80 OSTEOPENIA: Status: ACTIVE | Noted: 2021-03-23

## 2021-03-23 PROBLEM — Z79.811 USE OF AROMATASE INHIBITORS: Status: ACTIVE | Noted: 2020-09-16

## 2021-03-23 PROBLEM — Z51.81 ENCOUNTER FOR MONITORING AROMATASE INHIBITOR THERAPY: Status: ACTIVE | Noted: 2021-03-23

## 2021-03-23 NOTE — HISTORY OF PRESENT ILLNESS
[de-identified] : 79 yo female with PMH of HTN , DLD , Afib on Xarelto , anemia , was referred to us by Dr Banerjee for consultation of left breast IDC. Pt reports that in May, 2020, she was found to have severe anemia on blood work. Hb was 5.6 with MCV of 76. Iron studies showed serum iron 27 , iron binding capacity 372 ,  % sat 7% and ferritin of 13.  She was transfused 3 units of PRBC and Xarelto was held . GI w/u was was unremarkable. During that time on physical exam pt was found to have left breast palpable mass with nipple inversion. She was then referred for a mammogram  on 6/19 that showed irregular and spiculated hypoechoic mass in left breast approx 3 x 3.5 cm , highly suspicious for malignancy. Ultrasound guided core biopsy on 6/17/202 showed IDC with focal papillary features associated with microcalcifications, ER %, PA 1-10%, HER2 Equivocal by IHC, FISH-Negative, Ki-67 40%.\par \par Pt saw Dr Banerjee and underwent left mastectomy, SLNB and axillary node dissection 08/05/2020. Final pathology revealed: \par 1. Breast, left axillary sentinel lymph node #1, biopsy:\par - Micro-metastatic carcinoma present in one sentinel lymph node (1/1), measuring 1.8 mm (microscopic measurement).\par - No extracapsular extension is seen.\par 2. Breast, left axillary sentinel lymph node #2, biopsy:\par - Two benign lymph nodes (0/2). Negative for carcinoma with evaluation of multiple H T E levels and with negative immunohistochemical stains for cytokeratins.\par 3. Breast, left, modified radical mastectomy:\par - Retroareolar healing prior biopsy site with invasive poorly differentiated ductal carcinoma, 3.5 cm (radiologic measurement), with both calcifications and coagulative tumoral necrosis. 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 However, no involvement of the overlying epidermis is seen.\par - Surrounding markedly atrophic fatty breast tissue.\par - Benign skin, nipple with retraction, and deep fascial margin including skeletal muscle. Negative for carcinoma.\par - AJCC 8th Edition Pathologic Stage: pT2, pN1mi, pMx.\par 4. Breast, left axillary contents, excision:\par - Eighteen benign axillary lymph nodes (0/18). Negative for carcinoma.\par \par The surgical specimen was sent for Oncotype Dx assay. Her RS is 28, in the intermediate risk range and predicting 22% risk of distant recurrence at 9 years with endocrine therapy alone.  \par \par She recovered well from surgery. She is here with her formal caregiver today to discuss adjuvant systemic therapy. 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 She has ECOG of 1 . She has extensive family history with her sister diagnosed with breast cancer at 61, her father with stomach cancer at 49, her other sister with pancreatic cancer at 75. No genetic testing in the patient or her family. \par \par \par  [de-identified] : 3/11/2021:\par The patient is here for followup visit. 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 Oncotype RS is 28, in the intermediate risk range and predicting 22% risk of distant recurrence at 9 years with endocrine therapy alone.  Due to her age, adjuvant chemotherapy was not the consideration. She started Letrozole 9/2020 and tolerating it well.  Bone density was done 10/2020 showed osteopenia.  She takes Caltrate daily. She does not have new complains.

## 2021-03-23 NOTE — HISTORY OF PRESENT ILLNESS
[de-identified] : 81 yo female with PMH of HTN , DLD , Afib on Xarelto , anemia , was referred to us by Dr Banerjee for consultation of left breast IDC. Pt reports that in May, 2020, she was found to have severe anemia on blood work. Hb was 5.6 with MCV of 76. Iron studies showed serum iron 27 , iron binding capacity 372 ,  % sat 7% and ferritin of 13.  She was transfused 3 units of PRBC and Xarelto was held . GI w/u was was unremarkable. During that time on physical exam pt was found to have left breast palpable mass with nipple inversion. She was then referred for a mammogram  on 6/19 that showed irregular and spiculated hypoechoic mass in left breast approx 3 x 3.5 cm , highly suspicious for malignancy. Ultrasound guided core biopsy on 6/17/202 showed IDC with focal papillary features associated with microcalcifications, ER %, HI 1-10%, HER2 Equivocal by IHC, FISH-Negative, Ki-67 40%.\par \par Pt saw Dr Banerjee and underwent left mastectomy, SLNB and axillary node dissection 08/05/2020. Final pathology revealed: \par 1. Breast, left axillary sentinel lymph node #1, biopsy:\par - Micro-metastatic carcinoma present in one sentinel lymph node (1/1), measuring 1.8 mm (microscopic measurement).\par - No extracapsular extension is seen.\par 2. Breast, left axillary sentinel lymph node #2, biopsy:\par - Two benign lymph nodes (0/2). Negative for carcinoma with evaluation of multiple H T E levels and with negative immunohistochemical stains for cytokeratins.\par 3. Breast, left, modified radical mastectomy:\par - Retroareolar healing prior biopsy site with invasive poorly differentiated ductal carcinoma, 3.5 cm (radiologic measurement), with both calcifications and coagulative tumoral necrosis. 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 However, no involvement of the overlying epidermis is seen.\par - Surrounding markedly atrophic fatty breast tissue.\par - Benign skin, nipple with retraction, and deep fascial margin including skeletal muscle. Negative for carcinoma.\par - AJCC 8th Edition Pathologic Stage: pT2, pN1mi, pMx.\par 4. Breast, left axillary contents, excision:\par - Eighteen benign axillary lymph nodes (0/18). Negative for carcinoma.\par \par The surgical specimen was sent for Oncotype Dx assay. Her RS is 28, in the intermediate risk range and predicting 22% risk of distant recurrence at 9 years with endocrine therapy alone.  \par \par She recovered well from surgery. She is here with her formal caregiver today to discuss adjuvant systemic therapy. 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 She has ECOG of 1 . She has extensive family history with her sister diagnosed with breast cancer at 61, her father with stomach cancer at 49, her other sister with pancreatic cancer at 75. No genetic testing in the patient or her family. \par \par \par  [de-identified] : 3/11/2021:\par The patient is here for followup visit. She has stage IIB ([id number]) IDC of the left breast, G3, ER positives 100%, IA 10%, Her-2 negative, s/p left breast mastectomy, SLNB and ALND. Oncotype RS is 28, in the intermediate risk range and predicting 22% risk of distant recurrence at 9 years with endocrine therapy alone.  Due to her age, adjuvant chemotherapy was not the consideration. She started Letrozole 9/2020 and tolerating it well.  Bone density was done 10/2020 showed osteopenia.  She takes Caltrate daily. She does not have new complains.

## 2021-03-23 NOTE — ASSESSMENT
[FreeTextEntry1] : 81 yo female has stage IIB ([id number]) IDC of the left breast, G3, ER positives 100%, W[place] 10%, Her-2 negative, s/p left breast mastectomy, SLNB and ALND.\par Oncotype RS is 28, in the intermediate risk range and predicting 22% risk of distant recurrence at 9 years with endocrine therapy alone.  \par \par Recommendation:\par -- Continue Letrozole daily. The sided effects were reviewed again which may include but not limit to muscular and skeletal symptoms, arthralgia, increasing osteopenia and osteoporosis, a small increased risk of cardiovascular events and slight increase of hyperlipidemia.  \par -- We discussed bone health management given Letrozole may reduce bone density. Her bone density showed  osteopenia in femoral neck with T score -2.0 and in hip with T score -2.1. She is advised to take calcium and vitamin D supplement. Encourage health life style and regular physical activities. Bone directed agent such as Prolia can be considered.\par -- Breast exam did not reveal palpable mass or axillary adenopathy. She will continue right breast screening mammo.\par -- We also discussed genetic testing given her personal and family h/o breast cancer, and family h/o pancreatic cancer and stomach cancers. However, she is not interested in it. \par -- Followup with Dr. Banerjee as directed.\par -- RTO for f/u in 6 months. \par \par \par

## 2021-03-23 NOTE — PHYSICAL EXAM
[Restricted in physically strenuous activity but ambulatory and able to carry out work of a light or sedentary nature] : Status 1- Restricted in physically strenuous activity but ambulatory and able to carry out work of a light or sedentary nature, e.g., light house work, office work [Normal] : grossly intact [de-identified] : Status post left breast mastectomy. Surgical scar is healing well. There is no palpable lumps or adenopathy in right breast and right axilla.

## 2021-03-23 NOTE — ASSESSMENT
[FreeTextEntry1] : 79 yo female has stage IIB ([id number]) IDC of the left breast, G3, ER positives 100%, DC 10%, Her-2 negative, s/p left breast mastectomy, SLNB and ALND.\par Oncotype RS is 28, in the intermediate risk range and predicting 22% risk of distant recurrence at 9 years with endocrine therapy alone.  \par \par Recommendation:\par -- Continue Letrozole daily. The sided effects were reviewed again which may include but not limit to muscular and skeletal symptoms, arthralgia, increasing osteopenia and osteoporosis, a small increased risk of cardiovascular events and slight increase of hyperlipidemia.  \par -- We discussed bone health management given Letrozole may reduce bone density. Her bone density showed  osteopenia in femoral neck with T score -2.0 and in hip with T score -2.1. She is advised to take calcium and vitamin D supplement. Encourage health life style and regular physical activities. Bone directed agent such as Prolia can be considered.\par -- Breast exam did not reveal palpable mass or axillary adenopathy. She will continue right breast screening mammo.\par -- We also discussed genetic testing given her personal and family h/o breast cancer, and family h/o pancreatic cancer and stomach cancers. However, she is not interested in it. \par -- Followup with Dr. Banerjee as directed.\par -- RTO for f/u in 6 months. \par \par \par

## 2021-03-23 NOTE — CONSULT LETTER
[Dear  ___] : Dear  [unfilled], [Courtesy Letter:] : I had the pleasure of seeing your patient, [unfilled], in my office today. [Please see my note below.] : Please see my note below. [Sincerely,] : Sincerely, [DrServando  ___] : Dr. CHOUDHARY [FreeTextEntry3] : Claudia Chávez MD

## 2021-03-23 NOTE — PHYSICAL EXAM
[Restricted in physically strenuous activity but ambulatory and able to carry out work of a light or sedentary nature] : Status 1- Restricted in physically strenuous activity but ambulatory and able to carry out work of a light or sedentary nature, e.g., light house work, office work [Normal] : grossly intact [de-identified] : Status post left breast mastectomy. Surgical scar is healing well. There is no palpable lumps or adenopathy in right breast and right axilla.

## 2021-03-30 DIAGNOSIS — C50.412 MALIGNANT NEOPLASM OF UPPER-OUTER QUADRANT OF LEFT FEMALE BREAST: ICD-10-CM

## 2021-03-30 DIAGNOSIS — Z51.81 ENCOUNTER FOR THERAPEUTIC DRUG LEVEL MONITORING: ICD-10-CM

## 2021-03-30 DIAGNOSIS — M85.80 OTHER SPECIFIED DISORDERS OF BONE DENSITY AND STRUCTURE, UNSPECIFIED SITE: ICD-10-CM

## 2021-06-09 ENCOUNTER — APPOINTMENT (OUTPATIENT)
Dept: CARDIOLOGY | Facility: CLINIC | Age: 81
End: 2021-06-09

## 2021-06-16 ENCOUNTER — RX RENEWAL (OUTPATIENT)
Age: 81
End: 2021-06-16

## 2021-07-15 ENCOUNTER — APPOINTMENT (OUTPATIENT)
Dept: BREAST CENTER | Facility: CLINIC | Age: 81
End: 2021-07-15

## 2021-09-16 ENCOUNTER — APPOINTMENT (OUTPATIENT)
Dept: HEMATOLOGY ONCOLOGY | Facility: CLINIC | Age: 81
End: 2021-09-16

## 2021-09-24 ENCOUNTER — RX RENEWAL (OUTPATIENT)
Age: 81
End: 2021-09-24

## 2021-09-24 RX ORDER — METOPROLOL TARTRATE 100 MG/1
100 TABLET, FILM COATED ORAL TWICE DAILY
Qty: 180 | Refills: 1 | Status: ACTIVE | COMMUNITY
Start: 2021-06-16 | End: 1900-01-01

## 2022-03-10 NOTE — ED ADULT NURSE NOTE - NSFALLRSKINDICTYPE_ED_ALL_ED
Scheduled procedure with Patient over the phone  Scheduled Via: Case request order for [facility]  Procedure date: 3/29/22  Procedure time: 0945, Arrival time 0845  Insurance confirmed as Payor: [employer] / Plan: [employer] / Product Type: Market[employer] Exchange  , will be the same at time of procedure?: No    The following have been confirmed:  Covid-19 vaccine 1-No   Covid-19 vaccine 2 No   Covid Booster? No    Latex Allergy No  Diabetic No  Insulin No  CGM/Pump? No - Will need to be removed for procedure  Sleep Apnea No  Diuretic/Water pill No  Defibrillator/Pacemaker No  Heart attack or stroke in last 6 months No   MRSA hx No  Blood thinners: Coumadin (Warfarin) or Plavix No      Aspirin Yes      Phentermine (diet pill) No  Allergy to steroid or contrast No  Fish oil Yes  Vitamins Yes  Herbal Supplements Yes       Need for Mobility Assisted Device